# Patient Record
Sex: MALE | Race: WHITE | NOT HISPANIC OR LATINO | Employment: FULL TIME | URBAN - METROPOLITAN AREA
[De-identification: names, ages, dates, MRNs, and addresses within clinical notes are randomized per-mention and may not be internally consistent; named-entity substitution may affect disease eponyms.]

---

## 2022-02-06 ENCOUNTER — TELEPHONE (OUTPATIENT)
Dept: SCHEDULING | Facility: IMAGING CENTER | Age: 47
End: 2022-02-06

## 2022-03-20 SDOH — ECONOMIC STABILITY: FOOD INSECURITY: WITHIN THE PAST 12 MONTHS, THE FOOD YOU BOUGHT JUST DIDN'T LAST AND YOU DIDN'T HAVE MONEY TO GET MORE.: NEVER TRUE

## 2022-03-20 SDOH — ECONOMIC STABILITY: TRANSPORTATION INSECURITY
IN THE PAST 12 MONTHS, HAS THE LACK OF TRANSPORTATION KEPT YOU FROM MEDICAL APPOINTMENTS OR FROM GETTING MEDICATIONS?: NO

## 2022-03-20 SDOH — ECONOMIC STABILITY: HOUSING INSECURITY
IN THE LAST 12 MONTHS, WAS THERE A TIME WHEN YOU DID NOT HAVE A STEADY PLACE TO SLEEP OR SLEPT IN A SHELTER (INCLUDING NOW)?: NO

## 2022-03-20 SDOH — HEALTH STABILITY: MENTAL HEALTH
STRESS IS WHEN SOMEONE FEELS TENSE, NERVOUS, ANXIOUS, OR CAN'T SLEEP AT NIGHT BECAUSE THEIR MIND IS TROUBLED. HOW STRESSED ARE YOU?: ONLY A LITTLE

## 2022-03-20 SDOH — ECONOMIC STABILITY: TRANSPORTATION INSECURITY
IN THE PAST 12 MONTHS, HAS LACK OF RELIABLE TRANSPORTATION KEPT YOU FROM MEDICAL APPOINTMENTS, MEETINGS, WORK OR FROM GETTING THINGS NEEDED FOR DAILY LIVING?: NO

## 2022-03-20 SDOH — HEALTH STABILITY: PHYSICAL HEALTH: ON AVERAGE, HOW MANY DAYS PER WEEK DO YOU ENGAGE IN MODERATE TO STRENUOUS EXERCISE (LIKE A BRISK WALK)?: 7 DAYS

## 2022-03-20 SDOH — ECONOMIC STABILITY: INCOME INSECURITY: HOW HARD IS IT FOR YOU TO PAY FOR THE VERY BASICS LIKE FOOD, HOUSING, MEDICAL CARE, AND HEATING?: NOT HARD AT ALL

## 2022-03-20 SDOH — ECONOMIC STABILITY: FOOD INSECURITY: WITHIN THE PAST 12 MONTHS, YOU WORRIED THAT YOUR FOOD WOULD RUN OUT BEFORE YOU GOT MONEY TO BUY MORE.: NEVER TRUE

## 2022-03-20 SDOH — ECONOMIC STABILITY: HOUSING INSECURITY

## 2022-03-20 SDOH — ECONOMIC STABILITY: INCOME INSECURITY: IN THE LAST 12 MONTHS, WAS THERE A TIME WHEN YOU WERE NOT ABLE TO PAY THE MORTGAGE OR RENT ON TIME?: NO

## 2022-03-20 SDOH — HEALTH STABILITY: PHYSICAL HEALTH: ON AVERAGE, HOW MANY MINUTES DO YOU ENGAGE IN EXERCISE AT THIS LEVEL?: 90 MIN

## 2022-03-20 SDOH — ECONOMIC STABILITY: TRANSPORTATION INSECURITY
IN THE PAST 12 MONTHS, HAS LACK OF TRANSPORTATION KEPT YOU FROM MEETINGS, WORK, OR FROM GETTING THINGS NEEDED FOR DAILY LIVING?: NO

## 2022-03-20 ASSESSMENT — SOCIAL DETERMINANTS OF HEALTH (SDOH)
WITHIN THE PAST 12 MONTHS, YOU WORRIED THAT YOUR FOOD WOULD RUN OUT BEFORE YOU GOT THE MONEY TO BUY MORE: NEVER TRUE
HOW OFTEN DO YOU HAVE A DRINK CONTAINING ALCOHOL: 2-4 TIMES A MONTH
HOW HARD IS IT FOR YOU TO PAY FOR THE VERY BASICS LIKE FOOD, HOUSING, MEDICAL CARE, AND HEATING?: NOT HARD AT ALL
HOW OFTEN DO YOU GET TOGETHER WITH FRIENDS OR RELATIVES?: MORE THAN THREE TIMES A WEEK
HOW MANY DRINKS CONTAINING ALCOHOL DO YOU HAVE ON A TYPICAL DAY WHEN YOU ARE DRINKING: 1 OR 2
HOW OFTEN DO YOU ATTEND CHURCH OR RELIGIOUS SERVICES?: 1 TO 4 TIMES PER YEAR
HOW OFTEN DO YOU ATTENT MEETINGS OF THE CLUB OR ORGANIZATION YOU BELONG TO?: MORE THAN 4 TIMES PER YEAR
HOW OFTEN DO YOU GET TOGETHER WITH FRIENDS OR RELATIVES?: MORE THAN THREE TIMES A WEEK
HOW OFTEN DO YOU ATTENT MEETINGS OF THE CLUB OR ORGANIZATION YOU BELONG TO?: MORE THAN 4 TIMES PER YEAR
HOW OFTEN DO YOU ATTEND CHURCH OR RELIGIOUS SERVICES?: 1 TO 4 TIMES PER YEAR
IN A TYPICAL WEEK, HOW MANY TIMES DO YOU TALK ON THE PHONE WITH FAMILY, FRIENDS, OR NEIGHBORS?: MORE THAN THREE TIMES A WEEK
IN A TYPICAL WEEK, HOW MANY TIMES DO YOU TALK ON THE PHONE WITH FAMILY, FRIENDS, OR NEIGHBORS?: MORE THAN THREE TIMES A WEEK
HOW OFTEN DO YOU HAVE SIX OR MORE DRINKS ON ONE OCCASION: NEVER

## 2022-03-20 ASSESSMENT — LIFESTYLE VARIABLES
HOW MANY STANDARD DRINKS CONTAINING ALCOHOL DO YOU HAVE ON A TYPICAL DAY: 1 OR 2
HOW OFTEN DO YOU HAVE A DRINK CONTAINING ALCOHOL: 2-4 TIMES A MONTH
HOW OFTEN DO YOU HAVE SIX OR MORE DRINKS ON ONE OCCASION: NEVER

## 2022-03-23 ENCOUNTER — OFFICE VISIT (OUTPATIENT)
Dept: MEDICAL GROUP | Facility: PHYSICIAN GROUP | Age: 47
End: 2022-03-23
Payer: COMMERCIAL

## 2022-03-23 VITALS
TEMPERATURE: 97.9 F | HEIGHT: 74 IN | BODY MASS INDEX: 30.93 KG/M2 | HEART RATE: 74 BPM | RESPIRATION RATE: 14 BRPM | OXYGEN SATURATION: 97 % | WEIGHT: 241 LBS | SYSTOLIC BLOOD PRESSURE: 122 MMHG | DIASTOLIC BLOOD PRESSURE: 82 MMHG

## 2022-03-23 DIAGNOSIS — R06.83 LOUD SNORING: ICD-10-CM

## 2022-03-23 DIAGNOSIS — E66.9 OBESITY (BMI 30.0-34.9): ICD-10-CM

## 2022-03-23 DIAGNOSIS — K57.30 DIVERTICULOSIS OF COLON: ICD-10-CM

## 2022-03-23 DIAGNOSIS — I10 PRIMARY HYPERTENSION: ICD-10-CM

## 2022-03-23 DIAGNOSIS — Z91.89 AT RISK FOR SLEEP APNEA: ICD-10-CM

## 2022-03-23 DIAGNOSIS — E78.2 MIXED HYPERLIPIDEMIA: ICD-10-CM

## 2022-03-23 PROBLEM — E66.811 OBESITY (BMI 30.0-34.9): Status: ACTIVE | Noted: 2022-03-23

## 2022-03-23 PROCEDURE — 99204 OFFICE O/P NEW MOD 45 MIN: CPT | Performed by: INTERNAL MEDICINE

## 2022-03-23 RX ORDER — LOSARTAN POTASSIUM 100 MG/1
100 TABLET ORAL DAILY
Qty: 90 EACH | Refills: 3 | Status: SHIPPED | OUTPATIENT
Start: 2022-03-23 | End: 2023-05-23

## 2022-03-23 RX ORDER — LOSARTAN POTASSIUM 100 MG/1
TABLET ORAL
COMMUNITY
End: 2022-03-23 | Stop reason: SDUPTHER

## 2022-03-23 RX ORDER — ATORVASTATIN CALCIUM 20 MG/1
TABLET, FILM COATED ORAL
COMMUNITY
End: 2022-03-23 | Stop reason: SDUPTHER

## 2022-03-23 RX ORDER — POTASSIUM CHLORIDE 750 MG/1
CAPSULE, EXTENDED RELEASE ORAL
COMMUNITY
Start: 2022-02-08 | End: 2022-03-23

## 2022-03-23 RX ORDER — POTASSIUM CHLORIDE 750 MG/1
10 CAPSULE, EXTENDED RELEASE ORAL
COMMUNITY
Start: 2022-02-08 | End: 2022-03-23 | Stop reason: SDUPTHER

## 2022-03-23 RX ORDER — POTASSIUM CHLORIDE 750 MG/1
10 CAPSULE, EXTENDED RELEASE ORAL 2 TIMES DAILY
Qty: 180 CAPSULE | Refills: 3 | Status: SHIPPED | OUTPATIENT
Start: 2022-03-23 | End: 2022-10-13

## 2022-03-23 RX ORDER — ATORVASTATIN CALCIUM 20 MG/1
20 TABLET, FILM COATED ORAL DAILY
Qty: 90 TABLET | Refills: 3 | Status: SHIPPED | OUTPATIENT
Start: 2022-03-23 | End: 2023-05-25

## 2022-03-23 ASSESSMENT — PATIENT HEALTH QUESTIONNAIRE - PHQ9: CLINICAL INTERPRETATION OF PHQ2 SCORE: 0

## 2022-03-23 NOTE — PROGRESS NOTES
"New Patient to establish    Chief Complaint   Patient presents with   • Establish Care       Subjective:     History of Present Illness: Patient is a 46 y.o. male who is here today to establish primary care    Current Outpatient Medications on File Prior to Visit   Medication Sig Dispense Refill   • Ascorbic Acid (VITAMIN C PO) Take 500 mg by mouth.     • VITAMIN D PO Take  by mouth.     • ASPIRIN 81 PO Take  by mouth.       No current facility-administered medications on file prior to visit.     No Known Allergies  Patient Active Problem List    Diagnosis Date Noted   • Primary hypertension 03/23/2022   • Mixed hyperlipidemia 03/23/2022   • Obesity (BMI 30.0-34.9) 03/23/2022   • Loud snoring 03/23/2022   • At risk for sleep apnea 03/23/2022   • Diverticulosis of colon 03/23/2022     No past medical history on file.  No past surgical history on file.  No family history on file.       ROS:  All other systems reviewed and are negative except as stated in the HPI       Physical Exam:     /82 (BP Location: Left arm, Patient Position: Sitting, BP Cuff Size: Large adult)   Pulse 74   Temp 36.6 °C (97.9 °F) (Temporal)   Resp 14   Ht 1.88 m (6' 2\")   Wt 109 kg (241 lb)   SpO2 97%   BMI 30.94 kg/m²   General: Normal appearing. No distress.  Pulmonary: Clear to ausculation.  Normal effort.   Cardiovascular: Regular rate and rhythm    Assessment and Plan:     1. Primary hypertension  Chronic, stable, patient check blood pressure at home with normal range, he is taking losartan as below, denied having related symptoms  - losartan (COZAAR) 100 MG Tab; Take 1 Tablet by mouth every day.  Dispense: 90 Each; Refill: 3  - potassium chloride (MICRO-K) 10 MEQ capsule; Take 1 Capsule by mouth 2 times a day.  Dispense: 180 Capsule; Refill: 3    -discussion in details on target blood pressure goal, advised monitoring BP closely at home.  Have BP log to present at follow-up visit or send through my chart.   -Advised low-salt " diet, healthy dietary option include plenty of vegetable, reduce refine carbohydrates and sugar, regular exercise as tolerated, healthy fat/protein/carbs, also avoid alcohol, no NSAIDs  If symptoms worsen or persist patient can return to clinic for reevaluation.  Red flags and strict emergency room precautions discussed.  Discussed side effects of medication. Patient understand    2. Mixed hyperlipidemia  Chronic, stable, he is taking Lipitor 20 mg daily, denied having side effect  - atorvastatin (LIPITOR) 20 MG Tab; Take 1 Tablet by mouth every day.  Dispense: 90 Tablet; Refill: 3    3. Obesity (BMI 30.0-34.9)  4. Loud snoring  5. At risk for sleep apnea  Patient is actively losing weight, as well as tremendous amount of time spending with exercise, reported of loud snoring and sometimes stop breathing at night, there is risk for sleep apnea, however patient would like to lose more weight and see if he continue having snoring or not then will decide for sleep study    6. Diverticulosis of colon  Colonoscopy March 2021 with mild diverticulosis.  -Reported would like to colonoscopy every 2 to 3 years to make sure there is no polyps or precancerous lesion.      -Patient had lab done in December 2021 with normal CMP, CBC, total PSA 0.8, free PSA percentage 25,    Follow Up:      Return in about 6 months (around 9/23/2022).    Please note that this dictation was created using voice recognition software. I have made every reasonable attempt to correct obvious errors, but I expect that there are errors of grammar and possibly content that I did not discover before finalizing the note.    Signed by: Barb Day M.D.

## 2022-03-23 NOTE — LETTER
[facility]  Barb Day M.D.  [address]  [place] [address]  Fax: [phone]   Authorization for Release/Disclosure of   Protected Health Information   Name: KAISER MONTALVO : 1975 SSN: [id number]   Address: [facility] [address] [place] [address] Phone:    [phone] (home)    I authorize the entity listed below to release/disclose the PHI below to:   [facility]/Barb Day M.D. and Barb Day M.D.   Provider or Entity Name:  Dr. Junior Amin    Address   City, State, Zip   Phone:  [phone]    Fax:     Reason for request: continuity of care   Information to be released:    [  ] LAST COLONOSCOPY,  including any PATH REPORT and follow-up  [  ] LAST FIT/COLOGUARD RESULT [  ] LAST DEXA  [  ] LAST MAMMOGRAM  [  ] LAST PAP  [  ] LAST LABS [  ] RETINA EXAM REPORT  [  ] IMMUNIZATION RECORDS  [ xxx ] Release all info      [  ] Check here and initial the line next to each item to release ALL health information INCLUDING  _____ Care and treatment for drug and / or alcohol abuse  _____ HIV testing, infection status, or AIDS  _____ Genetic Testing    DATES OF SERVICE OR TIME PERIOD TO BE DISCLOSED: _____________  I understand and acknowledge that:  * This Authorization may be revoked at any time by you in writing, except if your health information has already been used or disclosed.  * Your health information that will be used or disclosed as a result of you signing this authorization could be re-disclosed by the recipient. If this occurs, your re-disclosed health information may no longer be protected by State or Federal laws.  * You may refuse to sign this Authorization. Your refusal will not affect your ability to obtain treatment.  * This Authorization becomes effective upon signing and will  on (date) __________.      If no date is indicated, this Authorization will  one (1) year from the signature date.    Name: Kaiser Montalvo    Signature: Continuity of Care     Date:     3/23/2022       PLEASE FAX REQUESTED RECORDS BACK TO: [phone]

## 2022-06-28 DIAGNOSIS — Z13.21 ENCOUNTER FOR VITAMIN DEFICIENCY SCREENING: ICD-10-CM

## 2022-06-28 DIAGNOSIS — I10 PRIMARY HYPERTENSION: ICD-10-CM

## 2022-06-28 DIAGNOSIS — E66.9 OBESITY (BMI 30.0-34.9): ICD-10-CM

## 2022-06-28 DIAGNOSIS — Z13.1 DIABETES MELLITUS SCREENING: ICD-10-CM

## 2022-06-28 DIAGNOSIS — E55.9 VITAMIN D DEFICIENCY: ICD-10-CM

## 2022-06-28 DIAGNOSIS — E78.2 MIXED HYPERLIPIDEMIA: ICD-10-CM

## 2022-06-28 DIAGNOSIS — Z13.29 THYROID DISORDER SCREEN: ICD-10-CM

## 2022-06-29 ENCOUNTER — HOSPITAL ENCOUNTER (OUTPATIENT)
Dept: LAB | Facility: MEDICAL CENTER | Age: 47
End: 2022-06-29
Attending: INTERNAL MEDICINE
Payer: COMMERCIAL

## 2022-06-29 DIAGNOSIS — Z13.29 THYROID DISORDER SCREEN: ICD-10-CM

## 2022-06-29 DIAGNOSIS — I10 PRIMARY HYPERTENSION: ICD-10-CM

## 2022-06-29 DIAGNOSIS — E78.2 MIXED HYPERLIPIDEMIA: ICD-10-CM

## 2022-06-29 DIAGNOSIS — E66.9 OBESITY (BMI 30.0-34.9): ICD-10-CM

## 2022-06-29 DIAGNOSIS — Z13.1 DIABETES MELLITUS SCREENING: ICD-10-CM

## 2022-06-29 DIAGNOSIS — E55.9 VITAMIN D DEFICIENCY: ICD-10-CM

## 2022-06-29 DIAGNOSIS — Z13.21 ENCOUNTER FOR VITAMIN DEFICIENCY SCREENING: ICD-10-CM

## 2022-06-29 LAB
25(OH)D3 SERPL-MCNC: 27 NG/ML (ref 30–100)
ALBUMIN SERPL BCP-MCNC: 4.7 G/DL (ref 3.2–4.9)
ALBUMIN/GLOB SERPL: 1.7 G/DL
ALP SERPL-CCNC: 58 U/L (ref 30–99)
ALT SERPL-CCNC: 32 U/L (ref 2–50)
ANION GAP SERPL CALC-SCNC: 12 MMOL/L (ref 7–16)
AST SERPL-CCNC: 21 U/L (ref 12–45)
BILIRUB SERPL-MCNC: 0.6 MG/DL (ref 0.1–1.5)
BUN SERPL-MCNC: 14 MG/DL (ref 8–22)
CALCIUM SERPL-MCNC: 9.5 MG/DL (ref 8.5–10.5)
CHLORIDE SERPL-SCNC: 104 MMOL/L (ref 96–112)
CHOLEST SERPL-MCNC: 169 MG/DL (ref 100–199)
CO2 SERPL-SCNC: 23 MMOL/L (ref 20–33)
CREAT SERPL-MCNC: 0.92 MG/DL (ref 0.5–1.4)
EST. AVERAGE GLUCOSE BLD GHB EST-MCNC: 117 MG/DL
FASTING STATUS PATIENT QL REPORTED: NORMAL
GFR SERPLBLD CREATININE-BSD FMLA CKD-EPI: 103 ML/MIN/1.73 M 2
GLOBULIN SER CALC-MCNC: 2.7 G/DL (ref 1.9–3.5)
GLUCOSE SERPL-MCNC: 77 MG/DL (ref 65–99)
HBA1C MFR BLD: 5.7 % (ref 4–5.6)
HDLC SERPL-MCNC: 58 MG/DL
LDLC SERPL CALC-MCNC: 90 MG/DL
POTASSIUM SERPL-SCNC: 4.1 MMOL/L (ref 3.6–5.5)
PROT SERPL-MCNC: 7.4 G/DL (ref 6–8.2)
SODIUM SERPL-SCNC: 139 MMOL/L (ref 135–145)
T3FREE SERPL-MCNC: 3.04 PG/ML (ref 2–4.4)
T4 FREE SERPL-MCNC: 1.85 NG/DL (ref 0.93–1.7)
THYROPEROXIDASE AB SERPL-ACNC: <9 IU/ML (ref 0–9)
TRIGL SERPL-MCNC: 105 MG/DL (ref 0–149)
TSH SERPL DL<=0.005 MIU/L-ACNC: 2.79 UIU/ML (ref 0.38–5.33)
VIT B12 SERPL-MCNC: 398 PG/ML (ref 211–911)

## 2022-06-29 PROCEDURE — 36415 COLL VENOUS BLD VENIPUNCTURE: CPT

## 2022-06-29 PROCEDURE — 83036 HEMOGLOBIN GLYCOSYLATED A1C: CPT

## 2022-06-29 PROCEDURE — 86376 MICROSOMAL ANTIBODY EACH: CPT

## 2022-06-29 PROCEDURE — 84439 ASSAY OF FREE THYROXINE: CPT

## 2022-06-29 PROCEDURE — 80053 COMPREHEN METABOLIC PANEL: CPT

## 2022-06-29 PROCEDURE — 84481 FREE ASSAY (FT-3): CPT

## 2022-06-29 PROCEDURE — 80061 LIPID PANEL: CPT

## 2022-06-29 PROCEDURE — 82607 VITAMIN B-12: CPT

## 2022-06-29 PROCEDURE — 82306 VITAMIN D 25 HYDROXY: CPT

## 2022-06-29 PROCEDURE — 84443 ASSAY THYROID STIM HORMONE: CPT

## 2022-06-30 DIAGNOSIS — Z12.5 PROSTATE CANCER SCREENING: ICD-10-CM

## 2022-09-22 ENCOUNTER — HOSPITAL ENCOUNTER (OUTPATIENT)
Dept: LAB | Facility: MEDICAL CENTER | Age: 47
End: 2022-09-22
Attending: INTERNAL MEDICINE
Payer: COMMERCIAL

## 2022-09-22 DIAGNOSIS — Z12.5 PROSTATE CANCER SCREENING: ICD-10-CM

## 2022-09-22 PROCEDURE — 84153 ASSAY OF PSA TOTAL: CPT

## 2022-09-22 PROCEDURE — 84154 ASSAY OF PSA FREE: CPT

## 2022-09-22 PROCEDURE — 36415 COLL VENOUS BLD VENIPUNCTURE: CPT

## 2022-09-27 LAB
PSA FREE MFR SERPL: 13 %
PSA FREE SERPL-MCNC: 0.2 NG/ML
PSA SERPL-MCNC: 1.5 NG/ML (ref 0–4)

## 2022-10-13 ENCOUNTER — OFFICE VISIT (OUTPATIENT)
Dept: MEDICAL GROUP | Facility: PHYSICIAN GROUP | Age: 47
End: 2022-10-13
Payer: COMMERCIAL

## 2022-10-13 VITALS
DIASTOLIC BLOOD PRESSURE: 82 MMHG | SYSTOLIC BLOOD PRESSURE: 132 MMHG | TEMPERATURE: 96.9 F | RESPIRATION RATE: 14 BRPM | WEIGHT: 255 LBS | BODY MASS INDEX: 32.73 KG/M2 | HEART RATE: 90 BPM | OXYGEN SATURATION: 92 % | HEIGHT: 74 IN

## 2022-10-13 DIAGNOSIS — Z23 NEED FOR VACCINATION: ICD-10-CM

## 2022-10-13 DIAGNOSIS — K57.30 DIVERTICULOSIS OF COLON: ICD-10-CM

## 2022-10-13 DIAGNOSIS — E78.2 MIXED HYPERLIPIDEMIA: ICD-10-CM

## 2022-10-13 DIAGNOSIS — Z13.21 ENCOUNTER FOR VITAMIN DEFICIENCY SCREENING: ICD-10-CM

## 2022-10-13 DIAGNOSIS — R79.89 ELEVATED SERUM FREE T4 LEVEL: ICD-10-CM

## 2022-10-13 DIAGNOSIS — E55.9 VITAMIN D DEFICIENCY: ICD-10-CM

## 2022-10-13 DIAGNOSIS — Z13.29 SCREENING FOR THYROID DISORDER: ICD-10-CM

## 2022-10-13 DIAGNOSIS — I10 PRIMARY HYPERTENSION: ICD-10-CM

## 2022-10-13 DIAGNOSIS — R73.03 PREDIABETES: ICD-10-CM

## 2022-10-13 DIAGNOSIS — E66.9 OBESITY (BMI 30.0-34.9): ICD-10-CM

## 2022-10-13 DIAGNOSIS — Z12.11 SCREEN FOR COLON CANCER: ICD-10-CM

## 2022-10-13 PROCEDURE — 99214 OFFICE O/P EST MOD 30 MIN: CPT | Mod: 25 | Performed by: INTERNAL MEDICINE

## 2022-10-13 PROCEDURE — 90686 IIV4 VACC NO PRSV 0.5 ML IM: CPT | Performed by: INTERNAL MEDICINE

## 2022-10-13 PROCEDURE — 90471 IMMUNIZATION ADMIN: CPT | Performed by: INTERNAL MEDICINE

## 2022-10-13 NOTE — LETTER
[facility]  Barb Day M.D.  [address]  [place] [address]  Fax: [phone]   Authorization for Release/Disclosure of   Protected Health Information   Name: KAISER CISSE : 1975 SSN: [id number]   Address: [address] [facility] [address] [place] [place] [address] Phone:    [phone] (home)    I authorize the entity listed below to release/disclose the PHI below to:   [facility]/Barb Dya M.D. and Barb Day M.D.   Provider or Entity Name:  [facility] / Pio Parrish MD   Address   City, State, [facility] [address]   Phone:      Fax:     Reason for request: continuity of care   Information to be released:    [  ] LAST COLONOSCOPY,  including any PATH REPORT and follow-up  [  ] LAST FIT/COLOGUARD RESULT [  ] LAST DEXA  [  ] LAST MAMMOGRAM  [  ] LAST PAP  [  ] LAST LABS [  ] RETINA EXAM REPORT  [  ] IMMUNIZATION RECORDS  [XX  ] Release all info      [  ] Check here and initial the line next to each item to release ALL health information INCLUDING  _____ Care and treatment for drug and / or alcohol abuse  _____ HIV testing, infection status, or AIDS  _____ Genetic Testing    DATES OF SERVICE OR TIME PERIOD TO BE DISCLOSED: _____________  I understand and acknowledge that:  * This Authorization may be revoked at any time by you in writing, except if your health information has already been used or disclosed.  * Your health information that will be used or disclosed as a result of you signing this authorization could be re-disclosed by the recipient. If this occurs, your re-disclosed health information may no longer be protected by State or Federal laws.  * You may refuse to sign this Authorization. Your refusal will not affect your ability to obtain treatment.  * This Authorization becomes effective upon signing and will  on (date) __________.      If no date is indicated, this Authorization will  one (1) year from the signature date.    Name: Kaiser Aguilar  Selvin    Signature: CONTINUITY OF CARE    Date:     10/13/2022       PLEASE FAX REQUESTED RECORDS BACK TO: [phone]

## 2022-10-13 NOTE — PROGRESS NOTES
"Established Patient    Chief Complaint   Patient presents with    Follow-Up       Subjective:     HPI:   Kaiser presents today with the following.    Patient Active Problem List    Diagnosis Date Noted    Prediabetes 10/13/2022    Vitamin D deficiency 06/28/2022    Primary hypertension 03/23/2022    Mixed hyperlipidemia 03/23/2022    Obesity (BMI 30.0-34.9) 03/23/2022    Loud snoring 03/23/2022    At risk for sleep apnea 03/23/2022    Diverticulosis of colon 03/23/2022       Current Outpatient Medications on File Prior to Visit   Medication Sig Dispense Refill    Ascorbic Acid (VITAMIN C PO) Take 500 mg by mouth.      VITAMIN D PO Take  by mouth.      atorvastatin (LIPITOR) 20 MG Tab Take 1 Tablet by mouth every day. 90 Tablet 3    losartan (COZAAR) 100 MG Tab Take 1 Tablet by mouth every day. 90 Each 3     No current facility-administered medications on file prior to visit.       Allergies, past medical history, past surgical history, family history, social history reviewed and updated    ROS:  All other systems reviewed and are negative except as stated in the HPI       Physical Exam:     /82 (BP Location: Right arm, Patient Position: Sitting, BP Cuff Size: Adult long)   Pulse 90   Temp 36.1 °C (96.9 °F) (Temporal)   Resp 14   Ht 1.88 m (6' 2\")   Wt 116 kg (255 lb)   SpO2 92%   BMI 32.74 kg/m²   General: Normal appearing. No distress.  Pulmonary: Clear to ausculation.  Normal effort.   Cardiovascular: Regular rate and rhythm    Assessment and Plan:     47 y.o. male with the following issues.    1. Primary hypertension  Well-controlled, patient continue losartan 100 mg daily, denied having symptoms related  - Comp Metabolic Panel; Future    2. Mixed hyperlipidemia  Patient continue atorvastatin 20 mg daily, denies any side effects  - Lipid Profile; Future    3. Obesity (BMI 30.0-34.9)    4. Diverticulosis of colon  Patient would like to perform colonoscopy next year, reported family history of GI " cancer as well    5. Vitamin D deficiency  Continue vitamin D supplement  - VITAMIN D,25 HYDROXY (DEFICIENCY); Future    6. Screening for thyroid disorder  - 9. Elevated serum free T4 level  - FREE THYROXINE; Future  - TSH; Future  - T3 FREE; Future    7. Encounter for vitamin deficiency screening  - VITAMIN B12; Future    8. Screen for colon cancer  - Referral to GI for Colonoscopy    10. Prediabetes  - HEMOGLOBIN A1C; Future    11. Need for vaccination  - INFLUENZA VACCINE QUAD INJ (PF)    Follow Up:      No follow-ups on file.    Please note that this dictation was created using voice recognition software. I have made every reasonable attempt to correct obvious errors, but I expect that there are errors of grammar and possibly content that I did not discover before finalizing the note.    Signed by: Barb Day M.D.

## 2023-01-19 ENCOUNTER — OFFICE VISIT (OUTPATIENT)
Dept: MEDICAL GROUP | Facility: PHYSICIAN GROUP | Age: 48
End: 2023-01-19
Payer: COMMERCIAL

## 2023-01-19 VITALS
TEMPERATURE: 98.6 F | DIASTOLIC BLOOD PRESSURE: 82 MMHG | RESPIRATION RATE: 12 BRPM | WEIGHT: 259 LBS | HEIGHT: 74 IN | SYSTOLIC BLOOD PRESSURE: 138 MMHG | OXYGEN SATURATION: 93 % | BODY MASS INDEX: 33.24 KG/M2 | HEART RATE: 74 BPM

## 2023-01-19 DIAGNOSIS — R73.03 PREDIABETES: ICD-10-CM

## 2023-01-19 DIAGNOSIS — Z00.00 HEALTH CARE MAINTENANCE: ICD-10-CM

## 2023-01-19 DIAGNOSIS — B00.1 HERPES LABIALIS: ICD-10-CM

## 2023-01-19 DIAGNOSIS — E78.2 MIXED HYPERLIPIDEMIA: ICD-10-CM

## 2023-01-19 DIAGNOSIS — E66.9 OBESITY (BMI 30.0-34.9): ICD-10-CM

## 2023-01-19 DIAGNOSIS — Z91.89 AT RISK FOR SLEEP APNEA: ICD-10-CM

## 2023-01-19 DIAGNOSIS — R06.83 LOUD SNORING: ICD-10-CM

## 2023-01-19 DIAGNOSIS — Z12.11 SCREEN FOR COLON CANCER: ICD-10-CM

## 2023-01-19 DIAGNOSIS — I10 PRIMARY HYPERTENSION: ICD-10-CM

## 2023-01-19 DIAGNOSIS — E55.9 VITAMIN D DEFICIENCY: ICD-10-CM

## 2023-01-19 PROCEDURE — 99396 PREV VISIT EST AGE 40-64: CPT | Performed by: INTERNAL MEDICINE

## 2023-01-19 PROCEDURE — 99213 OFFICE O/P EST LOW 20 MIN: CPT | Mod: 25 | Performed by: INTERNAL MEDICINE

## 2023-01-19 RX ORDER — VALACYCLOVIR HYDROCHLORIDE 500 MG/1
TABLET, FILM COATED ORAL
Qty: 30 TABLET | Refills: 3 | Status: SHIPPED | OUTPATIENT
Start: 2023-01-19 | End: 2023-05-30 | Stop reason: SDUPTHER

## 2023-01-19 ASSESSMENT — PATIENT HEALTH QUESTIONNAIRE - PHQ9: CLINICAL INTERPRETATION OF PHQ2 SCORE: 0

## 2023-01-19 NOTE — PROGRESS NOTES
"Established Patient    Chief Complaint   Patient presents with    Follow-Up    Medication Refill       Subjective:     HPI:   Kaiser presents today with the following.    Patient Active Problem List    Diagnosis Date Noted    Herpes labialis 01/19/2023    Prediabetes 10/13/2022    Vitamin D deficiency 06/28/2022    Primary hypertension 03/23/2022    Mixed hyperlipidemia 03/23/2022    Obesity (BMI 30.0-34.9) 03/23/2022    Loud snoring 03/23/2022    At risk for sleep apnea 03/23/2022    Diverticulosis of colon 03/23/2022       Current Outpatient Medications on File Prior to Visit   Medication Sig Dispense Refill    Ascorbic Acid (VITAMIN C PO) Take 500 mg by mouth.      VITAMIN D PO Take  by mouth.      atorvastatin (LIPITOR) 20 MG Tab Take 1 Tablet by mouth every day. 90 Tablet 3    losartan (COZAAR) 100 MG Tab Take 1 Tablet by mouth every day. 90 Each 3     No current facility-administered medications on file prior to visit.       Allergies, past medical history, past surgical history, family history, social history reviewed and updated    ROS:  All other systems reviewed and are negative except as stated in the HPI       Physical Exam:     /82 (BP Location: Right arm, Patient Position: Sitting, BP Cuff Size: Adult)   Pulse 74   Temp 37 °C (98.6 °F) (Temporal)   Resp 12   Ht 1.88 m (6' 2\")   Wt 117 kg (259 lb)   SpO2 93%   BMI 33.25 kg/m²   General: Normal appearing. No distress.  Pulmonary: Clear to ausculation.  Normal effort.   Cardiovascular: Regular rate and rhythm    Assessment and Plan:     47 y.o. male with the following issues.    1. Herpes labialis  Reported every 3 months scheduled her for surveillance, he would like to have treatment for recurrent episodes, valacyclovir   500 mg twice daily for 3 days order placed    2. Obesity (BMI 30.0-34.9)  - Overnight Home Sleep Study; Future  - Referral to Pulmonary and Sleep Medicine    3. Mixed hyperlipidemia  - Overnight Home Sleep Study; Future  - " Referral to Pulmonary and Sleep Medicine    4. Prediabetes  5. Primary hypertension  - Overnight Home Sleep Study; Future  - Referral to Pulmonary and Sleep Medicine    6. Vitamin D deficiency    7. Loud snoring  - Overnight Home Sleep Study; Future  - Referral to Pulmonary and Sleep Medicine    8. At risk for sleep apnea  - Overnight Home Sleep Study; Future  - Referral to Pulmonary and Sleep Medicine    9. Screen for colon cancer  Healthcare maintenance  [facility]  Patient refused recent vaccines  Patient check blood pressure at home with normal range, he is taking losartan 100 mg daily, also Lipitor 20 mg daily, denies any side effect,    -discussion in details on target blood pressure goal, advised monitoring BP closely at home.  Have BP log to present at follow-up visit or send through my chart.   -Advised low-salt diet, healthy dietary option include plenty of vegetable, reduce refine carbohydrates and sugar, regular exercise as tolerated, healthy fat/protein/carbs, also avoid alcohol, no NSAIDs  If symptoms worsen or persist patient can return to clinic for reevaluation.  Red flags and strict emergency room precautions discussed.  Discussed side effects of medication. Patient understand    Patient also has pending lab, advised to perform labs    Patient also is here for wellness, denied having depression or other mood issues, he is regularly following up with ophthalmology as well as dentist, he is also performing regular exercise and healthy lifestyle healthy diet      STOP BANG score    Do you snore loudly: yes  Do you often feel tired, fatigued, or sleepy during daytime? yes  Has anyone observed you stop breathing during your sleep? Not sure  Do you have or are you being treated for high blood pressure? yes  BMI more than 35 kg m-2? no  Age over 50 years? no  Neck circumference >40 cm? yes    If positive on 3 or more, screen for DAY via sleep study    Follow Up:      Return in about 4 weeks  (around 2/16/2023) for labs.    Please note that this dictation was created using voice recognition software. I have made every reasonable attempt to correct obvious errors, but I expect that there are errors of grammar and possibly content that I did not discover before finalizing the note.    Signed by: Barb Day M.D.

## 2023-02-13 ENCOUNTER — OFFICE VISIT (OUTPATIENT)
Dept: MEDICAL GROUP | Facility: PHYSICIAN GROUP | Age: 48
End: 2023-02-13
Payer: COMMERCIAL

## 2023-02-13 ENCOUNTER — HOSPITAL ENCOUNTER (OUTPATIENT)
Dept: RADIOLOGY | Facility: MEDICAL CENTER | Age: 48
End: 2023-02-13
Attending: INTERNAL MEDICINE
Payer: COMMERCIAL

## 2023-02-13 VITALS
SYSTOLIC BLOOD PRESSURE: 148 MMHG | WEIGHT: 262 LBS | HEART RATE: 64 BPM | BODY MASS INDEX: 33.62 KG/M2 | DIASTOLIC BLOOD PRESSURE: 80 MMHG | OXYGEN SATURATION: 96 % | TEMPERATURE: 97.8 F | HEIGHT: 74 IN | RESPIRATION RATE: 14 BRPM

## 2023-02-13 DIAGNOSIS — N50.812 LEFT TESTICULAR PAIN: ICD-10-CM

## 2023-02-13 DIAGNOSIS — N50.89 TESTICULAR SWELLING, LEFT: ICD-10-CM

## 2023-02-13 DIAGNOSIS — N50.812 TESTICULAR PAIN, LEFT: ICD-10-CM

## 2023-02-13 PROCEDURE — 99214 OFFICE O/P EST MOD 30 MIN: CPT | Performed by: INTERNAL MEDICINE

## 2023-02-13 PROCEDURE — 76870 US EXAM SCROTUM: CPT

## 2023-02-13 NOTE — PROGRESS NOTES
"Established Patient    Chief Complaint   Patient presents with    Testicle Pain     Swelling       Subjective:     HPI:   Kaiser presents today with the following.    Patient Active Problem List    Diagnosis Date Noted    Herpes labialis 01/19/2023    Prediabetes 10/13/2022    Vitamin D deficiency 06/28/2022    Primary hypertension 03/23/2022    Mixed hyperlipidemia 03/23/2022    Obesity (BMI 30.0-34.9) 03/23/2022    Loud snoring 03/23/2022    At risk for sleep apnea 03/23/2022    Diverticulosis of colon 03/23/2022       Current Outpatient Medications on File Prior to Visit   Medication Sig Dispense Refill    valACYclovir (VALTREX) 500 MG Tab Take 500 mg twice daily for 3 days during episodes of herpes labialis 30 Tablet 3    Ascorbic Acid (VITAMIN C PO) Take 500 mg by mouth.      VITAMIN D PO Take  by mouth.      atorvastatin (LIPITOR) 20 MG Tab Take 1 Tablet by mouth every day. 90 Tablet 3    losartan (COZAAR) 100 MG Tab Take 1 Tablet by mouth every day. 90 Each 3     No current facility-administered medications on file prior to visit.       Allergies, past medical history, past surgical history, family history, social history reviewed and updated    ROS:  All other systems reviewed and are negative except as stated in the HPI       Physical Exam:     BP (!) 148/80 (BP Location: Left arm, Patient Position: Sitting, BP Cuff Size: Adult long)   Pulse 64   Temp 36.6 °C (97.8 °F) (Temporal)   Resp 14   Ht 1.88 m (6' 2\")   Wt 119 kg (262 lb)   SpO2 96%   BMI 33.64 kg/m²   General: Normal appearing. No distress.  Pulmonary: Clear to ausculation.  Normal effort.   Cardiovascular: Regular rate and rhythm    Assessment and Plan:     47 y.o. male with the following issues.    1. Left testicular pain  - [id number]; Future    2. Testicular swelling, left  Reported had some push-up with some exercise, after that resulted in some pain of the left testicle, he traveled to [place] to visit his family members, " ended up having severe pain with testicular swelling, he visited primary care over there and think that the patient has orchitis, prescribed Augmentin of 500 mg twice daily for 7 days, reported some partial improvement with antibiotic, he also had some lab with unremarkable finding, urinalysis was unremarkable  Today on exam the left testicle is swollen, painful, tender, suspicious for orchitis, patient denied having penile discharge, denied having STD risks,   Ultrasound stat order placed, he would like to do today because he is going to travel to [place] tonight, if this is continued, will perform UA, urine culture as well as STD work-up    > ER visit precaution explained to the patient in detail.  Patient understands      Please note that this dictation was created using voice recognition software. I have made every reasonable attempt to correct obvious errors, but I expect that there are errors of grammar and possibly content that I did not discover before finalizing the note.    Signed by: Barb Day M.D.

## 2023-02-23 ENCOUNTER — TELEMEDICINE (OUTPATIENT)
Dept: SLEEP MEDICINE | Facility: MEDICAL CENTER | Age: 48
End: 2023-02-23
Attending: INTERNAL MEDICINE
Payer: COMMERCIAL

## 2023-02-23 VITALS — HEIGHT: 74 IN | BODY MASS INDEX: 32.34 KG/M2 | WEIGHT: 252 LBS

## 2023-02-23 DIAGNOSIS — I10 PRIMARY HYPERTENSION: ICD-10-CM

## 2023-02-23 DIAGNOSIS — G47.30 SLEEP DISORDER BREATHING: Primary | ICD-10-CM

## 2023-02-23 DIAGNOSIS — E66.9 OBESITY (BMI 30.0-34.9): ICD-10-CM

## 2023-02-23 DIAGNOSIS — R06.81 WITNESSED EPISODE OF APNEA: ICD-10-CM

## 2023-02-23 DIAGNOSIS — R06.83 LOUD SNORING: ICD-10-CM

## 2023-02-23 PROCEDURE — 99203 OFFICE O/P NEW LOW 30 MIN: CPT | Mod: 95 | Performed by: STUDENT IN AN ORGANIZED HEALTH CARE EDUCATION/TRAINING PROGRAM

## 2023-02-23 NOTE — PROGRESS NOTES
[facility] Consult Note     Date: 2023 / Time: 10:15 AM    CC: Telemedicine sleep consultation    This sleep consultation is provided using Telemedicine and secure encrypted software. Consent was obtained.    Consulting MD: Chito Sutherland M.D.  Requesting Physician: Barb Day M.D.  Patient name:      Kaiser Montalvo  : 1975   MRN:  [id number]    This visit was conducted via Zoom using secure and encrypted videoconferencing technology.   The patient was in a private location.    The patient's identity was confirmed and verbal consent was obtained for this virtual visit.      HISTORY OF PRESENT ILLNESS:     Kaiser Montalvo is a 47 y.o. male with hypertension, hyperlipidemia, obesity and loud snoring.  Presents to Sleep Clinic for evaluation of snoring.     He reports for the last 20 years he has had snoring.  Over the years his snoring has continued to be loud.  His wife is reported that he does have gasping episodes and occasional pauses in his breathing at night.  He overall finds his sleep restorative.  He has no trouble falling asleep or staying asleep.    He reports overall he is doing well.  He tends to maintain an active lifestyle with hiking and outdoor activities.  He works out fairly regularly in the morning.  He states he is mainly here at the recommendation of his wife regarding his snoring as there are times where he will often leave the bedroom and not cosleep with his wife due to how loud his snoring is.    As per suppose questioner:    Sleep Schedule  Bedtime:  9-930pm  Weekend 9-30pm  Wake time:  4-430am Weekend 4-430am  Sleep-onset latency: mins   Awakenings from sleep: 1-2  Difficulty falling back asleep: No  Bedroom partner: Wife sometimes,   Naps: on weekends 2-3 pm, for 1 hour      DAYTIME SYMPTOMS:   Excessive daytime sleepiness: No   Daytime fatigue: No   Difficulty concentrating: No   Memory problems: No   Irritability:No  "  Work/school performance issues: No   Sleepiness with driving: No   Caffeine/stimulant use: Yes coffee in morning stops 1pm  Alcohol use:Yes, How Many? 2-3 drinks a week      SLEEP RELATED SYMPTOMS  Snoring: YES  Witnessed apnea or gasping/choking: Yes, gasping per wife, witnessed apneas   Dry mouth or mouth breathing: No   Sweating: No   Teeth grinding/biting: Yes in past   Morning headaches: No   Refreshed Upon Awakening: Yes     SLEEP RELATED BEHAVIORS:  Parasomnias (walking, talking, eating, violence): No   Leg kicking: No   Restless legs - \"urge to move\": No   Nightmares: No  Recurrent: No   Dream enactment: No      NARCOLEPSY:  Cataplexy: No   Sleep paralysis: No   Sleep attacks: No   Hypnagogic/hypnopompic hallucinations: No     MEDICAL HISTORY  History reviewed. No pertinent past medical history.     SURGICAL HISTORY  History reviewed. No pertinent surgical history.     FAMILY HISTORY  History reviewed. No pertinent family history.    SOCIAL HISTORY  Social History     Socioeconomic History    Marital status:     Highest education level: Professional school degree (e.g., MD, DDS, DVM, KIN)   Tobacco Use    Smoking status: Never    Smokeless tobacco: Never   Vaping Use    Vaping Use: Never used   Substance and Sexual Activity    Alcohol use: Yes     Alcohol/week: 0.6 oz     Types: 1 Glasses of wine per week     Comment: weekends    Drug use: Never     Social Determinants of Health     Financial Resource Strain: Low Risk     Difficulty of Paying Living Expenses: Not hard at all   Food Insecurity: No Food Insecurity    Worried About Running Out of Food in the Last Year: Never true    Ran Out of Food in the Last Year: Never true   Transportation Needs: No Transportation Needs    Lack of Transportation (Medical): No    Lack of Transportation (Non-Medical): No   Physical Activity: Sufficiently Active    Days of Exercise per Week: 7 days    Minutes of Exercise per Session: 90 min   Stress: No Stress Concern " "Present    Feeling of Stress : Only a little   Social Connections: Socially Integrated    Frequency of Communication with Friends and Family: More than three times a week    Frequency of Social Gatherings with Friends and Family: More than three times a week    Attends [employer] Services: 1 to 4 times per year    Attends Club or Organization Meetings: More than 4 times per year    Marital Status:    Housing Stability: Unknown    Unable to Pay for Housing in the Last Year: No    Unstable Housing in the Last Year: No        Occupation:  for tech, 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      VITAMIN D PO Take  by mouth.      atorvastatin (LIPITOR) 20 MG Tab Take 1 Tablet by mouth every day. 90 Tablet 3    losartan (COZAAR) 100 MG Tab Take 1 Tablet by mouth every day. 90 Each 3     No current facility-administered medications for this visit.       REVIEW OF SYSTEMS  Constitutional: Denies fevers, Denies weight changes  Ears/Nose/Throat/Mouth: Denies nasal congestion or sore throat   Cardiovascular: Denies chest pain or palpitations   Respiratory: Denies shortness of breath, Denies cough  Gastrointestinal/Hepatic: Denies abdominal pain, nausea, vomiting, diarrhea  Genitourinary: Denies nocturia  Musculoskeletal/Rheum: Denies  joint pain and swelling   Skin/Breast: Denies rash  Sleep: See HPI    Physical Examination:  Vitals/ General Appearance:   Weight/BMI: Body mass index is 32.35 kg/m².  Ht 1.88 m (6' 2\")   Wt 114 kg (252 lb)   Vitals:    02/23/23 1011   Weight: 114 kg (252 lb)   Height: 1.88 m (6' 2\")       General: alert and oriented to time, place and person. Cooperative and in no apparent distress.   Constitutional: Alert, no distress, well-groomed.  Voice: normal " volume and last  Head: normocephalic   Pulmonary: Voice normal volume and last. No sounds or signs of increased work of breathing.   Neurologic: No involuntary movements     Medical decision making  The medical record was reviewed. Care gaps identified and reviewed with the patient.    Diagnostic and titration nocturnal polysomnogram's, home sleep apnea tests, continuous nocturnal oximetry results, multiple sleep latency tests, and compliance reports reviewed.    Assessment and Plan:    Kaiser Montalvo is a 47 y.o. male with hypertension, hyperlipidemia, obesity and loud snoring.  Presents to Sleep Clinic for evaluation of snoring.       The patient has signs and symptoms consistent with obstructive sleep apnea syndrome.  His symptoms of sleep apnea include loud snoring, gasping in sleep, and witnessed apneas.  Will schedule a home sleep apnea test.    The risks of untreated sleep apnea were discussed with the patient at length. Patients with DAY are at increased risk of cardiovascular disease including coronary artery disease, systemic arterial hypertension, pulmonary arterial hypertension, cardiac arrythmias, and stroke. DAY patients have an increased risk of motor vehicle accidents, type 2 diabetes, chronic kidney disease, and non-alcoholic liver disease. The patient was advised to avoid driving a motor vehicle when drowsy.    He does have comorbidities which can be impacted by untreated sleep apnea including hypertension and hyperlipidemia.    Positive airway pressure will favorably impact many of the adverse conditions and effects provoked by DAY. Discussed alternatives to PAP therapy such as oral appliance, surgeries related to DAY and that PAP remains the gold standard therapy.     Have advised the patient to follow up with the appropriate healthcare practitioners for all other medical problems and issues.      Return for after sleep study.      Please note portions of this record was created  using voice recognition software. I have made every reasonable attempt to correct obvious errors, but I expect that there are errors of grammar and possibly content I did not discover before finalizing the note.

## 2023-03-09 ENCOUNTER — HOSPITAL ENCOUNTER (OUTPATIENT)
Dept: LAB | Facility: MEDICAL CENTER | Age: 48
End: 2023-03-09
Attending: INTERNAL MEDICINE
Payer: COMMERCIAL

## 2023-03-09 ENCOUNTER — HOSPITAL ENCOUNTER (OUTPATIENT)
Dept: LAB | Facility: MEDICAL CENTER | Age: 48
End: 2023-03-09
Attending: UROLOGY
Payer: COMMERCIAL

## 2023-03-09 DIAGNOSIS — Z13.29 SCREENING FOR THYROID DISORDER: ICD-10-CM

## 2023-03-09 DIAGNOSIS — I10 PRIMARY HYPERTENSION: ICD-10-CM

## 2023-03-09 DIAGNOSIS — E55.9 VITAMIN D DEFICIENCY: ICD-10-CM

## 2023-03-09 DIAGNOSIS — Z13.21 ENCOUNTER FOR VITAMIN DEFICIENCY SCREENING: ICD-10-CM

## 2023-03-09 DIAGNOSIS — R73.03 PREDIABETES: ICD-10-CM

## 2023-03-09 DIAGNOSIS — E78.2 MIXED HYPERLIPIDEMIA: ICD-10-CM

## 2023-03-09 DIAGNOSIS — R79.89 ELEVATED SERUM FREE T4 LEVEL: ICD-10-CM

## 2023-03-09 LAB
25(OH)D3 SERPL-MCNC: 25 NG/ML (ref 30–100)
ALBUMIN SERPL BCP-MCNC: 4.2 G/DL (ref 3.2–4.9)
ALBUMIN/GLOB SERPL: 1.5 G/DL
ALP SERPL-CCNC: 59 U/L (ref 30–99)
ALT SERPL-CCNC: 37 U/L (ref 2–50)
ANION GAP SERPL CALC-SCNC: 10 MMOL/L (ref 7–16)
AST SERPL-CCNC: 20 U/L (ref 12–45)
BILIRUB SERPL-MCNC: 0.5 MG/DL (ref 0.1–1.5)
BUN SERPL-MCNC: 15 MG/DL (ref 8–22)
CALCIUM ALBUM COR SERPL-MCNC: 9.4 MG/DL (ref 8.5–10.5)
CALCIUM SERPL-MCNC: 9.6 MG/DL (ref 8.5–10.5)
CHLORIDE SERPL-SCNC: 104 MMOL/L (ref 96–112)
CHOLEST SERPL-MCNC: 180 MG/DL (ref 100–199)
CO2 SERPL-SCNC: 27 MMOL/L (ref 20–33)
CREAT SERPL-MCNC: 0.82 MG/DL (ref 0.5–1.4)
EST. AVERAGE GLUCOSE BLD GHB EST-MCNC: 108 MG/DL
GFR SERPLBLD CREATININE-BSD FMLA CKD-EPI: 109 ML/MIN/1.73 M 2
GLOBULIN SER CALC-MCNC: 2.8 G/DL (ref 1.9–3.5)
GLUCOSE SERPL-MCNC: 105 MG/DL (ref 65–99)
HBA1C MFR BLD: 5.4 % (ref 4–5.6)
HDLC SERPL-MCNC: 49 MG/DL
LDLC SERPL CALC-MCNC: 98 MG/DL
POTASSIUM SERPL-SCNC: 4.4 MMOL/L (ref 3.6–5.5)
PROT SERPL-MCNC: 7 G/DL (ref 6–8.2)
PSA SERPL-MCNC: 1.47 NG/ML (ref 0–4)
SODIUM SERPL-SCNC: 141 MMOL/L (ref 135–145)
T3FREE SERPL-MCNC: 3.67 PG/ML (ref 2–4.4)
T4 FREE SERPL-MCNC: 1.81 NG/DL (ref 0.93–1.7)
TRIGL SERPL-MCNC: 163 MG/DL (ref 0–149)
TSH SERPL DL<=0.005 MIU/L-ACNC: 1.7 UIU/ML (ref 0.38–5.33)
VIT B12 SERPL-MCNC: 366 PG/ML (ref 211–911)

## 2023-03-09 PROCEDURE — 36415 COLL VENOUS BLD VENIPUNCTURE: CPT

## 2023-03-09 PROCEDURE — 84443 ASSAY THYROID STIM HORMONE: CPT

## 2023-03-09 PROCEDURE — 84481 FREE ASSAY (FT-3): CPT

## 2023-03-09 PROCEDURE — 82607 VITAMIN B-12: CPT

## 2023-03-09 PROCEDURE — 83036 HEMOGLOBIN GLYCOSYLATED A1C: CPT

## 2023-03-09 PROCEDURE — 80053 COMPREHEN METABOLIC PANEL: CPT

## 2023-03-09 PROCEDURE — 84153 ASSAY OF PSA TOTAL: CPT

## 2023-03-09 PROCEDURE — 82306 VITAMIN D 25 HYDROXY: CPT

## 2023-03-09 PROCEDURE — 80061 LIPID PANEL: CPT

## 2023-03-09 PROCEDURE — 84439 ASSAY OF FREE THYROXINE: CPT

## 2023-03-16 ENCOUNTER — APPOINTMENT (OUTPATIENT)
Dept: SLEEP MEDICINE | Facility: MEDICAL CENTER | Age: 48
End: 2023-03-16
Payer: COMMERCIAL

## 2023-03-28 ENCOUNTER — HOME STUDY (OUTPATIENT)
Dept: SLEEP MEDICINE | Facility: MEDICAL CENTER | Age: 48
End: 2023-03-28
Attending: STUDENT IN AN ORGANIZED HEALTH CARE EDUCATION/TRAINING PROGRAM
Payer: COMMERCIAL

## 2023-03-28 DIAGNOSIS — G47.30 SLEEP DISORDER BREATHING: ICD-10-CM

## 2023-03-28 DIAGNOSIS — I10 PRIMARY HYPERTENSION: ICD-10-CM

## 2023-03-28 DIAGNOSIS — E66.9 OBESITY (BMI 30.0-34.9): ICD-10-CM

## 2023-03-28 DIAGNOSIS — R06.83 LOUD SNORING: ICD-10-CM

## 2023-03-28 DIAGNOSIS — R06.81 WITNESSED EPISODE OF APNEA: ICD-10-CM

## 2023-03-28 PROCEDURE — 95800 SLP STDY UNATTENDED: CPT | Performed by: STUDENT IN AN ORGANIZED HEALTH CARE EDUCATION/TRAINING PROGRAM

## 2023-03-30 ENCOUNTER — OFFICE VISIT (OUTPATIENT)
Dept: MEDICAL GROUP | Facility: PHYSICIAN GROUP | Age: 48
End: 2023-03-30
Payer: COMMERCIAL

## 2023-03-30 VITALS
OXYGEN SATURATION: 95 % | HEIGHT: 74 IN | BODY MASS INDEX: 32.08 KG/M2 | DIASTOLIC BLOOD PRESSURE: 82 MMHG | SYSTOLIC BLOOD PRESSURE: 142 MMHG | WEIGHT: 250 LBS | HEART RATE: 81 BPM | RESPIRATION RATE: 14 BRPM | TEMPERATURE: 98.1 F

## 2023-03-30 DIAGNOSIS — G47.33 OSA (OBSTRUCTIVE SLEEP APNEA): ICD-10-CM

## 2023-03-30 DIAGNOSIS — N50.3 EPIDIDYMAL CYST: ICD-10-CM

## 2023-03-30 DIAGNOSIS — N52.9 ERECTILE DYSFUNCTION, UNSPECIFIED ERECTILE DYSFUNCTION TYPE: ICD-10-CM

## 2023-03-30 PROBLEM — R06.83 LOUD SNORING: Status: RESOLVED | Noted: 2022-03-23 | Resolved: 2023-03-30

## 2023-03-30 PROBLEM — Z91.89 AT RISK FOR SLEEP APNEA: Status: RESOLVED | Noted: 2022-03-23 | Resolved: 2023-03-30

## 2023-03-30 PROCEDURE — 99213 OFFICE O/P EST LOW 20 MIN: CPT | Performed by: INTERNAL MEDICINE

## 2023-03-30 RX ORDER — SILDENAFIL 50 MG/1
50 TABLET, FILM COATED ORAL
Qty: 10 TABLET | Refills: 3 | Status: SHIPPED | OUTPATIENT
Start: 2023-03-30 | End: 2023-05-30 | Stop reason: SDUPTHER

## 2023-03-30 NOTE — PROGRESS NOTES
"Established Patient    Chief Complaint   Patient presents with    Follow-Up       Subjective:     HPI:   Kaiser presents today with the following.    Patient Active Problem List    Diagnosis Date Noted    ED (erectile dysfunction) 03/30/2023    Epididymal cyst 03/30/2023    DAY (obstructive sleep apnea) 03/30/2023    Herpes labialis 01/19/2023    Prediabetes 10/13/2022    Vitamin D deficiency 06/28/2022    Primary hypertension 03/23/2022    Mixed hyperlipidemia 03/23/2022    Obesity (BMI 30.0-34.9) 03/23/2022    Diverticulosis of colon 03/23/2022       Current Outpatient Medications on File Prior to Visit   Medication Sig Dispense Refill    valACYclovir (VALTREX) 500 MG Tab Take 500 mg twice daily for 3 days during episodes of herpes labialis 30 Tablet 3    Ascorbic Acid (VITAMIN C PO) Take 500 mg by mouth.      VITAMIN D PO Take  by mouth.      atorvastatin (LIPITOR) 20 MG Tab Take 1 Tablet by mouth every day. 90 Tablet 3    losartan (COZAAR) 100 MG Tab Take 1 Tablet by mouth every day. 90 Each 3     No current facility-administered medications on file prior to visit.       Allergies, past medical history, past surgical history, family history, social history reviewed and updated    ROS:  All other systems reviewed and are negative except as stated in the HPI       Physical Exam:     BP (!) 142/82 (BP Location: Right arm, Patient Position: Sitting, BP Cuff Size: Adult)   Pulse 81   Temp 36.7 °C (98.1 °F) (Temporal)   Resp 14   Ht 1.88 m (6' 2\")   Wt 113 kg (250 lb)   SpO2 95%   BMI 32.10 kg/m²   General: Normal appearing. No distress.  Pulmonary: Clear to ausculation.  Normal effort.   Cardiovascular: Regular rate and rhythm    Assessment and Plan:     47 y.o. male with the following issues.    1. Erectile dysfunction, unspecified erectile dysfunction type  Patient would like to have some Viagra pills for erectile dysfunction, reported this happened last week suddenly, denied having any mental issues or any " organic reason for that, would like to have a trial of Viagra and go from there, explained to the patient significant complication as well.  Patient understands  - sildenafil citrate (VIAGRA) 50 MG tablet; Take 1 Tablet by mouth 1 time a day as needed for Erectile Dysfunction.  Dispense: 10 Tablet; Refill: 3    2. Epididymal cyst  Ultrasound 2023:   Large 4.2 cm mildly complicated cyst head of the left epididymis.  Reported will have surgery with urology in April 2023, currently denied having symptoms    3. DAY (obstructive sleep apnea)  Recently done sleep study with evidence of severe sleep apnea, patient will follow-up with the sleep medicine in May 2023.    Discussed in details typical signs and symptoms of obstructive sleep apnea. And that is not treating sleep apnea can result in multiple health problems. Particularly emphasized the cardiovascular risks.     Pt is urged to avoid supine sleep, weight gain and alcoholic beverages since all of these can worsen sleep apnea. He is cautioned against drowsy driving. If He feels sleepy while driving, He must pull over for a break/nap, rather than persist on the road, in the interest of He own safety and that of others on the road.    Please note that this dictation was created using voice recognition software. I have made every reasonable attempt to correct obvious errors, but I expect that there are errors of grammar and possibly content that I did not discover before finalizing the note.    Signed by: Barb Day M.D.

## 2023-04-04 NOTE — PROCEDURES
DIAGNOSTIC HOME SLEEP TEST (HST) REPORT WatchPAT      PATIENT ID:  NAME:  Kaiser Montalvo  MRN:               [id number]  YOB: 1975  DATE OF STUDY: 03/28/2023      Impression:     This study shows evidence of:      1. Severe obstructive sleep apnea with PAT apnea hypopnea index(pAHI) of 51.8 per hour.  PAT respiratory disturbance index (pRDI) was 52.1 per hour. These findings are based on 7 channels recording of PAT signal with sleep staging, heart rate, pulse oximetry, actigraphy, body position, snoring and respiratory movement.     2. Oxygenation O2 Sat. mean O2 sat was 91%,  radha was 81%,  and maximum O2 at 97 %. O2 sat was at or  below 88% for 20.1 min of evaluation time. Oxygen Desaturation (>=4%) Index was 35.7/hr. AVG HR was 80 BPM.      TECHNICAL DESCRIPTION: Patient underwent home sleep apnea testing with peripheral arterial tone signal (WatchPAT™). This is a Type IV portable monitor and device per Medicare. Monitoring was done with 7 channels recording of PAT signal with sleep staging, heart rate, pulse oximetry, actigraphy, body position, snoring and respiratory movement. Prior to using the device, the patient received verbal and written instructions for its application and was provided with the help desk phone number for additional telephonic instruction with 24-hour availability of qualified personnel to answer questions.    Respiratory events:    pRDI: Total 356 (REM index 32/hr. NREM index 55.8/hr, All Night 52.1/hr)    3% pAHI: Total 354 (REM index 31/hr. NREM index 55.6/hr, All Night 51.8/hr)    3% pAHIc: Total 39 ( All Night 5.9/hr)    4% pAHI: Total 261 (All Night 38.2/hr)    General sleep summary: . Total recording time is 8 hours and 2 minutes and total Sleep time is 7 hours and 17 minutes. The patient spent 401 minutes in the supine position and 36 minutes in the nonsupine position.      Recommendations:    1. CPAP titration study vs Auto CPAP trial .   2.   In general  patients with sleep apnea are advised to avoid alcohol and sedatives and to not operate a motor vehicle while drowsy. In some cases alternative treatment options may prove effective in resolving sleep apnea in these options include upper airway surgery, the use of a dental orthotic or weight loss and positional therapy. Clinical correlation is required.         Chito Sutherland MD

## 2023-05-09 ENCOUNTER — TELEMEDICINE (OUTPATIENT)
Dept: SLEEP MEDICINE | Facility: MEDICAL CENTER | Age: 48
End: 2023-05-09
Attending: STUDENT IN AN ORGANIZED HEALTH CARE EDUCATION/TRAINING PROGRAM
Payer: COMMERCIAL

## 2023-05-09 VITALS — WEIGHT: 250 LBS | BODY MASS INDEX: 32.08 KG/M2 | HEIGHT: 74 IN

## 2023-05-09 DIAGNOSIS — G47.33 OSA (OBSTRUCTIVE SLEEP APNEA): Primary | ICD-10-CM

## 2023-05-09 DIAGNOSIS — R06.81 WITNESSED EPISODE OF APNEA: ICD-10-CM

## 2023-05-09 DIAGNOSIS — R06.83 LOUD SNORING: ICD-10-CM

## 2023-05-09 DIAGNOSIS — I10 PRIMARY HYPERTENSION: ICD-10-CM

## 2023-05-09 PROCEDURE — 99213 OFFICE O/P EST LOW 20 MIN: CPT | Mod: 95 | Performed by: STUDENT IN AN ORGANIZED HEALTH CARE EDUCATION/TRAINING PROGRAM

## 2023-05-09 ASSESSMENT — FIBROSIS 4 INDEX: FIB4 SCORE: 0.58

## 2023-05-09 NOTE — PROGRESS NOTES
[facility] Virtual Follow Up Note     Date: 2023 / Time: 12:59 PM    CC: Telemedicine sleep follow-up    This sleep consultation is provided using Telemedicine and secure encrypted software. Consent was obtained.    Consulting MD: Chito Sutherland M.D.  Requesting Physician: Barb Day M.D.  Patient name:      Kaiser Montalvo  : 1975  MRN: [id number]     This visit was conducted via Zoom using secure and encrypted videoconferencing technology.   The patient was in a private location [place] [facility]    The patient's identity was confirmed and verbal consent was obtained for this virtual visit.      HISTORY OF PRESENT ILLNESS:     Kaiser Montalvo is a 47 y.o. male with hypertension, hyperlipidemia, prediabetes, obesity, and severe obstructive sleep apnea nocturnal hypoxia.  Presents today to discuss recent sleep study results.    He reports neither sleep study was not the best night sleep.  He felt his sleep is very disrupted during the night of the study.  He did see the study results prior to today's visit.    At last visit it was discussed that he has loud snoring in his sleep.  In addition to the snoring he has been told by his wife that he does gasp in his sleep and have pauses in breathing.  Overall he finds his sleep restorative.  Denies any excessive daytime sleepiness or fatigue.  Continues to exercise regularly.      MEDICAL HISTORY  History reviewed. No pertinent past medical history.   Patient Active Problem List   Diagnosis    Primary hypertension    Mixed hyperlipidemia    Obesity (BMI 30.0-34.9)    Diverticulosis of colon    Vitamin D deficiency    Prediabetes    Herpes labialis    ED (erectile dysfunction)    Epididymal cyst    DAY (obstructive sleep apnea)         SURGICAL HISTORY  History reviewed. No pertinent surgical history.     FAMILY HISTORY  History reviewed. No pertinent family history.    SOCIAL HISTORY  Social History     Socioeconomic  "History    Marital status:     Highest education level: Professional school degree (e.g., MD, DDS, DVM, KIN)   Tobacco Use    Smoking status: Never    Smokeless tobacco: Never   Vaping Use    Vaping Use: Never used   Substance and Sexual Activity    Alcohol use: Yes     Alcohol/week: 0.6 oz     Types: 1 Glasses of wine per week     Comment: weekends    Drug use: Never        CURRENT MEDICATIONS  Current Outpatient Medications   Medication Sig Dispense Refill    sildenafil citrate (VIAGRA) 50 MG tablet Take 1 Tablet by mouth 1 time a day as needed for Erectile Dysfunction. 10 Tablet 3    valACYclovir (VALTREX) 500 MG Tab Take 500 mg twice daily for 3 days during episodes of herpes labialis 30 Tablet 3    Ascorbic Acid (VITAMIN C PO) Take 500 mg by mouth.      VITAMIN D PO Take  by mouth.      atorvastatin (LIPITOR) 20 MG Tab Take 1 Tablet by mouth every day. 90 Tablet 3    losartan (COZAAR) 100 MG Tab Take 1 Tablet by mouth every day. 90 Each 3     No current facility-administered medications for this visit.       REVIEW OF SYSTEMS  Constitutional: Denies fevers, Denies weight changes  Ears/Nose/Throat/Mouth: Denies nasal congestion or sore throat   Cardiovascular: Denies chest pain or palpitations   Respiratory: Denies shortness of breath, Denies cough  Gastrointestinal/Hepatic: Denies abdominal pain, nausea, vomiting, diarrhea  Musculoskeletal/Rheum: Denies  joint pain and swelling   Sleep: See HPI      Physical Examination:  Vitals/ General Appearance:   Weight/BMI: Body mass index is 32.1 kg/m².  Ht 1.88 m (6' 2\")   Wt 113 kg (250 lb)   Vitals:    05/09/23 1258   Weight: 113 kg (250 lb)   Height: 1.88 m (6' 2\")       General: alert and oriented to time, place and person. Cooperative and in no apparent distress.   Constitutional: Alert, no distress, well-groomed.  Voice: normal volume and last  Head: normocephalic   Pulmonary: Voice normal volume and last. No sounds or signs of increased work of " breathing.   Neurologic: No involuntary movements     Assessment and Plan  Kaiser Montalvo is a 47 y.o. male with hypertension, hyperlipidemia, prediabetes, obesity, and severe obstructive sleep apnea nocturnal hypoxia.  Presents today to discuss recent sleep study results.    The medical record was reviewed.    Obstructive sleep apnea   Reviewed recent HST with patient showing an AHI of 51.8,  and Min Oxygen saturation of 81% %.  Time at or below 88% saturation 20 minutes minutes  Based on sleep study and symptoms meets criteria for severe obstructive sleep apnea.   We discussed the pathophysiology of obstructive sleep apnea (DAY) and risk factors for the disease. We also discussed possible consequences of untreated DAY, including excessive daytime sleepiness and fatigue, cognitive dysfunction, cardiovascular complications such as elevated blood pressure, heart attacks, cardiac arrhythmias, and strokes. We discussed how DAY typically gets worse with age. We discussed treatment options for DAY, including the gold standard therapy (PAP), alternative options such as a mandibular advancement device (custom-made oral appliances) and surgeries. We will proceed CPAP therapy.     RECOMMENDATIONS  -Start Auto CPAP at pressures 5-15 cm H2O  -Discussed importance of adherence/compliance   -Prescription generated for supplies   -Patient counseled to avoid driving when sleepy. Encouraged to anticipate sleepiness, consider taking a 10 min nap prior to driving, alternate with another , or pull over if sleepy while driving  -Advised to contact our office or myself with any questions via MyChart  -Follow up in 3 months or sooner if needed      Positive airway pressure will favorably impact many of the adverse conditions and effects provoked by DAY.    Have advised the patient to follow up with the appropriate healthcare practitioners for all other medical problems and issues.    Return in about 3 months (around  8/9/2023).      Please note portions of this record was created using voice recognition software. I have made every reasonable attempt to correct obvious errors, but I expect that there are errors of grammar and possibly content I did not discover before finalizing the note.

## 2023-05-23 ENCOUNTER — TELEPHONE (OUTPATIENT)
Dept: SCHEDULING | Facility: IMAGING CENTER | Age: 48
End: 2023-05-23

## 2023-05-25 DIAGNOSIS — E78.2 MIXED HYPERLIPIDEMIA: ICD-10-CM

## 2023-05-25 RX ORDER — ATORVASTATIN CALCIUM 20 MG/1
TABLET, FILM COATED ORAL
Qty: 90 TABLET | Refills: 0 | Status: SHIPPED | OUTPATIENT
Start: 2023-05-25 | End: 2023-05-30 | Stop reason: SDUPTHER

## 2023-05-29 SDOH — ECONOMIC STABILITY: HOUSING INSECURITY: IN THE LAST 12 MONTHS, HOW MANY PLACES HAVE YOU LIVED?: 1

## 2023-05-29 SDOH — HEALTH STABILITY: PHYSICAL HEALTH: ON AVERAGE, HOW MANY MINUTES DO YOU ENGAGE IN EXERCISE AT THIS LEVEL?: 60 MIN

## 2023-05-29 SDOH — HEALTH STABILITY: PHYSICAL HEALTH: ON AVERAGE, HOW MANY DAYS PER WEEK DO YOU ENGAGE IN MODERATE TO STRENUOUS EXERCISE (LIKE A BRISK WALK)?: 5 DAYS

## 2023-05-29 SDOH — ECONOMIC STABILITY: FOOD INSECURITY: WITHIN THE PAST 12 MONTHS, YOU WORRIED THAT YOUR FOOD WOULD RUN OUT BEFORE YOU GOT MONEY TO BUY MORE.: NEVER TRUE

## 2023-05-29 SDOH — ECONOMIC STABILITY: INCOME INSECURITY: IN THE LAST 12 MONTHS, WAS THERE A TIME WHEN YOU WERE NOT ABLE TO PAY THE MORTGAGE OR RENT ON TIME?: NO

## 2023-05-29 SDOH — ECONOMIC STABILITY: INCOME INSECURITY: HOW HARD IS IT FOR YOU TO PAY FOR THE VERY BASICS LIKE FOOD, HOUSING, MEDICAL CARE, AND HEATING?: NOT HARD AT ALL

## 2023-05-29 SDOH — HEALTH STABILITY: MENTAL HEALTH
STRESS IS WHEN SOMEONE FEELS TENSE, NERVOUS, ANXIOUS, OR CAN'T SLEEP AT NIGHT BECAUSE THEIR MIND IS TROUBLED. HOW STRESSED ARE YOU?: NOT AT ALL

## 2023-05-29 SDOH — ECONOMIC STABILITY: FOOD INSECURITY: WITHIN THE PAST 12 MONTHS, THE FOOD YOU BOUGHT JUST DIDN'T LAST AND YOU DIDN'T HAVE MONEY TO GET MORE.: NEVER TRUE

## 2023-05-29 ASSESSMENT — LIFESTYLE VARIABLES
SKIP TO QUESTIONS 9-10: 1
HOW MANY STANDARD DRINKS CONTAINING ALCOHOL DO YOU HAVE ON A TYPICAL DAY: 1 OR 2
AUDIT-C TOTAL SCORE: 3
HOW OFTEN DO YOU HAVE SIX OR MORE DRINKS ON ONE OCCASION: NEVER
HOW OFTEN DO YOU HAVE A DRINK CONTAINING ALCOHOL: 2-3 TIMES A WEEK

## 2023-05-29 ASSESSMENT — SOCIAL DETERMINANTS OF HEALTH (SDOH)
HOW OFTEN DO YOU GET TOGETHER WITH FRIENDS OR RELATIVES?: MORE THAN THREE TIMES A WEEK
IN A TYPICAL WEEK, HOW MANY TIMES DO YOU TALK ON THE PHONE WITH FAMILY, FRIENDS, OR NEIGHBORS?: MORE THAN THREE TIMES A WEEK
HOW OFTEN DO YOU ATTEND CHURCH OR RELIGIOUS SERVICES?: 1 TO 4 TIMES PER YEAR
HOW MANY DRINKS CONTAINING ALCOHOL DO YOU HAVE ON A TYPICAL DAY WHEN YOU ARE DRINKING: 1 OR 2
HOW OFTEN DO YOU ATTEND CHURCH OR RELIGIOUS SERVICES?: 1 TO 4 TIMES PER YEAR
HOW OFTEN DO YOU GET TOGETHER WITH FRIENDS OR RELATIVES?: MORE THAN THREE TIMES A WEEK
IN A TYPICAL WEEK, HOW MANY TIMES DO YOU TALK ON THE PHONE WITH FAMILY, FRIENDS, OR NEIGHBORS?: MORE THAN THREE TIMES A WEEK
HOW HARD IS IT FOR YOU TO PAY FOR THE VERY BASICS LIKE FOOD, HOUSING, MEDICAL CARE, AND HEATING?: NOT HARD AT ALL
HOW OFTEN DO YOU ATTENT MEETINGS OF THE CLUB OR ORGANIZATION YOU BELONG TO?: PATIENT DECLINED
DO YOU BELONG TO ANY CLUBS OR ORGANIZATIONS SUCH AS CHURCH GROUPS UNIONS, FRATERNAL OR ATHLETIC GROUPS, OR SCHOOL GROUPS?: PATIENT DECLINED
HOW OFTEN DO YOU HAVE SIX OR MORE DRINKS ON ONE OCCASION: NEVER
HOW OFTEN DO YOU ATTENT MEETINGS OF THE CLUB OR ORGANIZATION YOU BELONG TO?: PATIENT DECLINED
HOW OFTEN DO YOU HAVE A DRINK CONTAINING ALCOHOL: 2-3 TIMES A WEEK
DO YOU BELONG TO ANY CLUBS OR ORGANIZATIONS SUCH AS CHURCH GROUPS UNIONS, FRATERNAL OR ATHLETIC GROUPS, OR SCHOOL GROUPS?: PATIENT DECLINED
WITHIN THE PAST 12 MONTHS, YOU WORRIED THAT YOUR FOOD WOULD RUN OUT BEFORE YOU GOT THE MONEY TO BUY MORE: NEVER TRUE

## 2023-05-30 ENCOUNTER — OFFICE VISIT (OUTPATIENT)
Dept: MEDICAL GROUP | Facility: PHYSICIAN GROUP | Age: 48
End: 2023-05-30
Payer: COMMERCIAL

## 2023-05-30 VITALS
HEART RATE: 65 BPM | BODY MASS INDEX: 33.07 KG/M2 | SYSTOLIC BLOOD PRESSURE: 142 MMHG | WEIGHT: 257.7 LBS | DIASTOLIC BLOOD PRESSURE: 90 MMHG | HEIGHT: 74 IN | OXYGEN SATURATION: 95 % | TEMPERATURE: 98 F

## 2023-05-30 DIAGNOSIS — N50.3 EPIDIDYMAL CYST: ICD-10-CM

## 2023-05-30 DIAGNOSIS — G47.33 OSA (OBSTRUCTIVE SLEEP APNEA): ICD-10-CM

## 2023-05-30 DIAGNOSIS — B00.1 HERPES LABIALIS: ICD-10-CM

## 2023-05-30 DIAGNOSIS — E78.2 MIXED HYPERLIPIDEMIA: ICD-10-CM

## 2023-05-30 DIAGNOSIS — R73.03 PREDIABETES: ICD-10-CM

## 2023-05-30 DIAGNOSIS — E55.9 VITAMIN D DEFICIENCY: ICD-10-CM

## 2023-05-30 DIAGNOSIS — I10 PRIMARY HYPERTENSION: ICD-10-CM

## 2023-05-30 DIAGNOSIS — N52.9 ERECTILE DYSFUNCTION, UNSPECIFIED ERECTILE DYSFUNCTION TYPE: ICD-10-CM

## 2023-05-30 DIAGNOSIS — Z23 NEED FOR VACCINATION: ICD-10-CM

## 2023-05-30 PROCEDURE — 99214 OFFICE O/P EST MOD 30 MIN: CPT | Mod: 25 | Performed by: FAMILY MEDICINE

## 2023-05-30 PROCEDURE — 3077F SYST BP >= 140 MM HG: CPT | Performed by: FAMILY MEDICINE

## 2023-05-30 PROCEDURE — 90471 IMMUNIZATION ADMIN: CPT | Performed by: FAMILY MEDICINE

## 2023-05-30 PROCEDURE — 90715 TDAP VACCINE 7 YRS/> IM: CPT | Performed by: FAMILY MEDICINE

## 2023-05-30 PROCEDURE — 3080F DIAST BP >= 90 MM HG: CPT | Performed by: FAMILY MEDICINE

## 2023-05-30 RX ORDER — CHOLECALCIFEROL (VITAMIN D3) 50 MCG
2000 TABLET ORAL DAILY
Qty: 90 TABLET | Refills: 3 | Status: SHIPPED | OUTPATIENT
Start: 2023-05-30

## 2023-05-30 RX ORDER — SILDENAFIL 50 MG/1
50 TABLET, FILM COATED ORAL
Qty: 10 TABLET | Refills: 3 | Status: SHIPPED | OUTPATIENT
Start: 2023-05-30 | End: 2023-05-31 | Stop reason: SDUPTHER

## 2023-05-30 RX ORDER — ATORVASTATIN CALCIUM 20 MG/1
20 TABLET, FILM COATED ORAL DAILY
Qty: 90 TABLET | Refills: 3 | Status: SHIPPED | OUTPATIENT
Start: 2023-05-30 | End: 2024-01-28 | Stop reason: SDUPTHER

## 2023-05-30 RX ORDER — ASCORBIC ACID 500 MG
500 TABLET ORAL DAILY
Qty: 90 TABLET | Refills: 3 | Status: SHIPPED | OUTPATIENT
Start: 2023-05-30

## 2023-05-30 RX ORDER — VALACYCLOVIR HYDROCHLORIDE 500 MG/1
TABLET, FILM COATED ORAL
Qty: 30 TABLET | Refills: 3 | Status: SHIPPED | OUTPATIENT
Start: 2023-05-30

## 2023-05-30 RX ORDER — LOSARTAN POTASSIUM 100 MG/1
100 TABLET ORAL DAILY
Qty: 90 TABLET | Refills: 3 | Status: SHIPPED | OUTPATIENT
Start: 2023-05-30

## 2023-05-30 RX ORDER — AMLODIPINE BESYLATE 5 MG/1
5 TABLET ORAL DAILY
Qty: 90 TABLET | Refills: 3 | Status: SHIPPED | OUTPATIENT
Start: 2023-05-30 | End: 2024-01-30 | Stop reason: SINTOL

## 2023-05-30 ASSESSMENT — FIBROSIS 4 INDEX: FIB4 SCORE: 0.58

## 2023-05-30 NOTE — PROGRESS NOTES
"CHIEF COMPLAINT / REASON FOR VISIT  Kaiser Montalvo is a 47 y.o. male that presents today to establish care.    HISTORY OF PRESENT ILLNESS  Generally feels well.    Feb 2023, swelling left testicle. Had left spermatocelectomy 4/20/23. Still has swelling and pain though is improving. Still following with urology, they are going to repeat US.    Checks blood pressures at home in the morning, run usually 130s/80s.     Past Medical History  Hyperlipidemia-atorvastatin 20 mg daily  Erectile dysfunction-sildenafil 50 mg as needed  Herpes simplex labialis-as needed Valtrex 500 mg twice daily x3 days  Hypertension-losartan 100 mg daily  DAY-on CPAP    Past Surgical History:   Procedure Laterality Date    TESTICLE EXPLORATION Left 04/20/2023     Social History     Tobacco Use    Smoking status: Never    Smokeless tobacco: Never   Vaping Use    Vaping Use: Never used   Substance Use Topics    Alcohol use: Yes     Alcohol/week: 0.6 oz     Types: 1 Glasses of wine per week     Comment: weekends    Drug use: Never     OBJECTIVE    BP (!) 142/90 (BP Location: Right arm, Patient Position: Sitting, BP Cuff Size: Adult)   Pulse 65   Temp 36.7 °C (98 °F) (Temporal)   Ht 1.88 m (6' 2\")   Wt 117 kg (257 lb 11.2 oz)   SpO2 95%   BMI 33.09 kg/m²      PHYSICAL EXAM  Constitutional: Sitting comfortably, in no acute distress, responds to questions appropriately.  Head: Normocephalic  Eyes:  No conjunctival injection, no scleral icterus, PERRL  Ears: External ear canals clear, TMs pearly grey with visualized bony landmarks and crisp light reflex  Mouth: Oral mucosa moist. Good dentition  Throat: Oropharynx clear without erythema or tonsillar exudates  Neck: No cervical lymphadenopathy  Heart: Regular S1 S2, no murmurs, rub, or gallops  Lungs: Clear to auscultation bilaterally, no wheezes, rales, or rhonchi  Extremities: No lower extremity edema  Skin: Warm and dry, no rashes or lesions on face or exposed upper " extremities    ASSESSMENT & PLAN  1. Primary hypertension  Uncontrolled on losartan 100 mg daily.  Goal less than 130/80.  We will start amlodipine 5 mg daily and he will continue to monitor his pressures at home.  If above goal he will let me know and we can increase his amlodipine dose  - losartan (COZAAR) 100 MG Tab; Take 1 Tablet by mouth every day.  Dispense: 90 Tablet; Refill: 3    2. Vitamin D deficiency  Vitamin D deficiency with level of 25.  Continue vitamin D3 supplementation with 2000 units daily  - Cholecalciferol (VITAMIN D) 2000 UNIT Tab; Take 1 Tablet by mouth every day.  Dispense: 90 Tablet; Refill: 3    3. Prediabetes  Last hemoglobin A1c normal at 5.4.  Continue healthy lifestyle recommendations.    4. DAY (obstructive sleep apnea)  Recent diagnosis, is going to  his CPAP machine next week    5. Mixed hyperlipidemia  LDL-C at goal of less than 100.  Continue atorvastatin 20 mg daily  - atorvastatin (LIPITOR) 20 MG Tab; Take 1 Tablet by mouth every day.  Dispense: 90 Tablet; Refill: 3    6. Epididymal cyst  S/p left spermatocelectomy 4/20/23. Still has swelling and pain though is improving. Following with urology, they are going to repeat scrotal US.    7. Herpes labialis  He reports that usually 1000 mg of Valtrex for 1 day is sufficient to yuan off an impending breakout.  Continue current therapy  - valACYclovir (VALTREX) 500 MG Tab; Take 500 mg twice daily for 3 days during episodes of herpes labialis  Dispense: 30 Tablet; Refill: 3    8. Erectile dysfunction, unspecified erectile dysfunction type  Continue to use as needed sildenafil  - sildenafil citrate (VIAGRA) 50 MG tablet; Take 1 Tablet by mouth 1 time a day as needed for Erectile Dysfunction.  Dispense: 10 Tablet; Refill: 3    9. Need for vaccination  - Tdap Vaccine =>8YO IM      At least annual visit

## 2023-06-20 ENCOUNTER — HOSPITAL ENCOUNTER (OUTPATIENT)
Dept: RADIOLOGY | Facility: MEDICAL CENTER | Age: 48
End: 2023-06-20
Attending: STUDENT IN AN ORGANIZED HEALTH CARE EDUCATION/TRAINING PROGRAM
Payer: COMMERCIAL

## 2023-06-20 DIAGNOSIS — N50.3 CYST OF EPIDIDYMIS: ICD-10-CM

## 2023-06-20 PROCEDURE — 76870 US EXAM SCROTUM: CPT

## 2024-01-28 DIAGNOSIS — E78.2 MIXED HYPERLIPIDEMIA: ICD-10-CM

## 2024-01-29 RX ORDER — ATORVASTATIN CALCIUM 20 MG/1
20 TABLET, FILM COATED ORAL DAILY
Qty: 90 TABLET | Refills: 3 | Status: SHIPPED | OUTPATIENT
Start: 2024-01-29

## 2024-01-29 NOTE — TELEPHONE ENCOUNTER
Received request via: Pharmacy    Was the patient seen in the last year in this department? Yes    Does the patient have an active prescription (recently filled or refills available) for medication(s) requested? No    Pharmacy Name: [facility],[place]     Does the patient have custodial Plus and need 100 day supply (blood pressure, diabetes and cholesterol meds only)? Patient does not have SCP

## 2024-01-30 ENCOUNTER — OFFICE VISIT (OUTPATIENT)
Dept: MEDICAL GROUP | Facility: PHYSICIAN GROUP | Age: 49
End: 2024-01-30
Payer: COMMERCIAL

## 2024-01-30 VITALS
RESPIRATION RATE: 14 BRPM | DIASTOLIC BLOOD PRESSURE: 80 MMHG | HEIGHT: 74 IN | TEMPERATURE: 98.1 F | WEIGHT: 259 LBS | SYSTOLIC BLOOD PRESSURE: 128 MMHG | HEART RATE: 75 BPM | OXYGEN SATURATION: 97 % | BODY MASS INDEX: 33.24 KG/M2

## 2024-01-30 DIAGNOSIS — E78.2 MIXED HYPERLIPIDEMIA: ICD-10-CM

## 2024-01-30 DIAGNOSIS — I10 PRIMARY HYPERTENSION: ICD-10-CM

## 2024-01-30 DIAGNOSIS — F51.02 ADJUSTMENT INSOMNIA: ICD-10-CM

## 2024-01-30 DIAGNOSIS — Z12.5 PROSTATE CANCER SCREENING: ICD-10-CM

## 2024-01-30 DIAGNOSIS — Z11.59 NEED FOR HEPATITIS C SCREENING TEST: ICD-10-CM

## 2024-01-30 DIAGNOSIS — Z23 NEED FOR VACCINATION: ICD-10-CM

## 2024-01-30 DIAGNOSIS — Z11.4 ENCOUNTER FOR SCREENING FOR HIV: ICD-10-CM

## 2024-01-30 DIAGNOSIS — E55.9 VITAMIN D DEFICIENCY: ICD-10-CM

## 2024-01-30 PROCEDURE — 3074F SYST BP LT 130 MM HG: CPT | Performed by: FAMILY MEDICINE

## 2024-01-30 PROCEDURE — 90686 IIV4 VACC NO PRSV 0.5 ML IM: CPT | Performed by: FAMILY MEDICINE

## 2024-01-30 PROCEDURE — 3079F DIAST BP 80-89 MM HG: CPT | Performed by: FAMILY MEDICINE

## 2024-01-30 PROCEDURE — 99214 OFFICE O/P EST MOD 30 MIN: CPT | Mod: 25 | Performed by: FAMILY MEDICINE

## 2024-01-30 PROCEDURE — 90471 IMMUNIZATION ADMIN: CPT | Performed by: FAMILY MEDICINE

## 2024-01-30 RX ORDER — ESZOPICLONE 2 MG/1
2 TABLET, FILM COATED ORAL NIGHTLY PRN
Qty: 30 TABLET | Refills: 0 | Status: SHIPPED | OUTPATIENT
Start: 2024-01-30 | End: 2024-02-29

## 2024-01-30 ASSESSMENT — PATIENT HEALTH QUESTIONNAIRE - PHQ9: CLINICAL INTERPRETATION OF PHQ2 SCORE: 0

## 2024-01-30 ASSESSMENT — FIBROSIS 4 INDEX: FIB4 SCORE: 0.6

## 2024-01-30 NOTE — PROGRESS NOTES
"CHIEF COMPLAINT / REASON FOR VISIT  Kaiser Montalvo is a 48 y.o. male that presents today for   Chief Complaint   Patient presents with    Annual Exam     HISTORY OF PRESENT ILLNESS  Traveling a lot for work, lots of flights around the [place], even [place]    At home usually has no trouble with sleep. Has difficulty with the shift in time zones    OBJECTIVE     /80 (BP Location: Left arm, Patient Position: Sitting, BP Cuff Size: Adult)   Pulse 75   Temp 36.7 °C (98.1 °F) (Temporal)   Resp 14   Ht 1.88 m (6' 2\")   Wt 117 kg (259 lb)   SpO2 97%   BMI 33.25 kg/m²      PHYSICAL EXAM  Constitutional: Sitting comfortably, in no acute distress, responds to questions appropriately.  Eyes:  No conjunctival injection, no scleral icterus, PERRL  Neck: No cervical lymphadenopathy  Mouth: oral mucosa moist  Throat: Oropharynx clear  Heart: Regular S1 S2, no murmurs, rub, or gallops  Lungs: Clear to auscultation bilaterally, no wheezes, rales, or rhonchi  Extremities: No lower extremity edema  Skin: Warm and dry, no rashes or lesions on face or exposed upper extremities    ASSESSMENT & PLAN  1. Adjustment insomnia  Insomnia due to changing time zones when traveling.  Previously had benefit with zopiclone, which was prescribed when he was in [place].  This is not available in the [place], so we will switch to eszopiclone 2 mg nightly PRN  - Eszopiclone (LUNESTA) 2 MG Tab; Take 1 Tablet by mouth at bedtime as needed (insomnia) for up to 30 days. Indications: Trouble Sleeping  Dispense: 30 Tablet; Refill: 0    2. Mixed hyperlipidemia  Chronic, controlled with atorvastatin 20 mg daily, continue current therapy  - Lipid Profile; Future    3. Primary hypertension  Chronic, controlled with losartan 100 mg daily, continue current therapy  - TSH+FREE T4  - CBC WITH DIFFERENTIAL; Future  - Comp Metabolic Panel; Future    4. Vitamin D deficiency  - VITAMIN D,25 HYDROXY (DEFICIENCY); Future    5. Prostate cancer screening  - PROSTATE " SPECIFIC AG SCREENING; Future    6. Need for vaccination  - INFLUENZA VACCINE QUAD INJ (PF)    7. Need for hepatitis C screening test  - HEP C VIRUS ANTIBODY; Future    8. Encounter for screening for HIV  - HIV AG/AB COMBO ASSAY SCREENING; Future    Will inform patient of lab results via [facility], unless requiring in person visit

## 2024-03-06 ENCOUNTER — PATIENT MESSAGE (OUTPATIENT)
Dept: MEDICAL GROUP | Facility: PHYSICIAN GROUP | Age: 49
End: 2024-03-06
Payer: COMMERCIAL

## 2024-03-06 DIAGNOSIS — F51.02 ADJUSTMENT INSOMNIA: ICD-10-CM

## 2024-03-07 RX ORDER — ESZOPICLONE 3 MG/1
3 TABLET, FILM COATED ORAL NIGHTLY PRN
Qty: 30 TABLET | Refills: 0 | Status: SHIPPED | OUTPATIENT
Start: 2024-03-07 | End: 2024-04-06

## 2024-03-20 ENCOUNTER — HOSPITAL ENCOUNTER (OUTPATIENT)
Dept: LAB | Facility: MEDICAL CENTER | Age: 49
End: 2024-03-20
Attending: FAMILY MEDICINE
Payer: COMMERCIAL

## 2024-03-20 DIAGNOSIS — Z11.4 ENCOUNTER FOR SCREENING FOR HIV: ICD-10-CM

## 2024-03-20 DIAGNOSIS — E55.9 VITAMIN D DEFICIENCY: ICD-10-CM

## 2024-03-20 DIAGNOSIS — Z11.59 NEED FOR HEPATITIS C SCREENING TEST: ICD-10-CM

## 2024-03-20 DIAGNOSIS — E78.2 MIXED HYPERLIPIDEMIA: ICD-10-CM

## 2024-03-20 DIAGNOSIS — Z12.5 PROSTATE CANCER SCREENING: ICD-10-CM

## 2024-03-20 DIAGNOSIS — I10 PRIMARY HYPERTENSION: ICD-10-CM

## 2024-03-20 LAB
25(OH)D3 SERPL-MCNC: 23 NG/ML (ref 30–100)
ALBUMIN SERPL BCP-MCNC: 4.5 G/DL (ref 3.2–4.9)
ALBUMIN/GLOB SERPL: 1.9 G/DL
ALP SERPL-CCNC: 62 U/L (ref 30–99)
ALT SERPL-CCNC: 34 U/L (ref 2–50)
ANION GAP SERPL CALC-SCNC: 12 MMOL/L (ref 7–16)
AST SERPL-CCNC: 23 U/L (ref 12–45)
BASOPHILS # BLD AUTO: 0.5 % (ref 0–1.8)
BASOPHILS # BLD: 0.03 K/UL (ref 0–0.12)
BILIRUB SERPL-MCNC: 0.6 MG/DL (ref 0.1–1.5)
BUN SERPL-MCNC: 14 MG/DL (ref 8–22)
CALCIUM ALBUM COR SERPL-MCNC: 8.8 MG/DL (ref 8.5–10.5)
CALCIUM SERPL-MCNC: 9.2 MG/DL (ref 8.5–10.5)
CHLORIDE SERPL-SCNC: 103 MMOL/L (ref 96–112)
CHOLEST SERPL-MCNC: 167 MG/DL (ref 100–199)
CO2 SERPL-SCNC: 26 MMOL/L (ref 20–33)
CREAT SERPL-MCNC: 0.84 MG/DL (ref 0.5–1.4)
EOSINOPHIL # BLD AUTO: 0.28 K/UL (ref 0–0.51)
EOSINOPHIL NFR BLD: 4.4 % (ref 0–6.9)
ERYTHROCYTE [DISTWIDTH] IN BLOOD BY AUTOMATED COUNT: 45 FL (ref 35.9–50)
GFR SERPLBLD CREATININE-BSD FMLA CKD-EPI: 107 ML/MIN/1.73 M 2
GLOBULIN SER CALC-MCNC: 2.4 G/DL (ref 1.9–3.5)
GLUCOSE SERPL-MCNC: 99 MG/DL (ref 65–99)
HCT VFR BLD AUTO: 46.8 % (ref 42–52)
HCV AB SER QL: NORMAL
HDLC SERPL-MCNC: 57 MG/DL
HGB BLD-MCNC: 15.5 G/DL (ref 14–18)
HIV 1+2 AB+HIV1 P24 AG SERPL QL IA: NORMAL
IMM GRANULOCYTES # BLD AUTO: 0.01 K/UL (ref 0–0.11)
IMM GRANULOCYTES NFR BLD AUTO: 0.2 % (ref 0–0.9)
LDLC SERPL CALC-MCNC: 82 MG/DL
LYMPHOCYTES # BLD AUTO: 2.6 K/UL (ref 1–4.8)
LYMPHOCYTES NFR BLD: 41.3 % (ref 22–41)
MCH RBC QN AUTO: 30.1 PG (ref 27–33)
MCHC RBC AUTO-ENTMCNC: 33.1 G/DL (ref 32.3–36.5)
MCV RBC AUTO: 90.9 FL (ref 81.4–97.8)
MONOCYTES # BLD AUTO: 0.57 K/UL (ref 0–0.85)
MONOCYTES NFR BLD AUTO: 9 % (ref 0–13.4)
NEUTROPHILS # BLD AUTO: 2.81 K/UL (ref 1.82–7.42)
NEUTROPHILS NFR BLD: 44.6 % (ref 44–72)
NRBC # BLD AUTO: 0 K/UL
NRBC BLD-RTO: 0 /100 WBC (ref 0–0.2)
PLATELET # BLD AUTO: 246 K/UL (ref 164–446)
PMV BLD AUTO: 9.9 FL (ref 9–12.9)
POTASSIUM SERPL-SCNC: 4.3 MMOL/L (ref 3.6–5.5)
PROT SERPL-MCNC: 6.9 G/DL (ref 6–8.2)
PSA SERPL-MCNC: 1.82 NG/ML (ref 0–4)
RBC # BLD AUTO: 5.15 M/UL (ref 4.7–6.1)
SODIUM SERPL-SCNC: 141 MMOL/L (ref 135–145)
T4 FREE SERPL-MCNC: 1.91 NG/DL (ref 0.93–1.7)
TRIGL SERPL-MCNC: 138 MG/DL (ref 0–149)
TSH SERPL DL<=0.005 MIU/L-ACNC: 2.23 UIU/ML (ref 0.38–5.33)
WBC # BLD AUTO: 6.3 K/UL (ref 4.8–10.8)

## 2024-03-20 PROCEDURE — 84443 ASSAY THYROID STIM HORMONE: CPT

## 2024-03-20 PROCEDURE — 84439 ASSAY OF FREE THYROXINE: CPT

## 2024-03-20 PROCEDURE — 87389 HIV-1 AG W/HIV-1&-2 AB AG IA: CPT

## 2024-03-20 PROCEDURE — 82306 VITAMIN D 25 HYDROXY: CPT

## 2024-03-20 PROCEDURE — 85025 COMPLETE CBC W/AUTO DIFF WBC: CPT

## 2024-03-20 PROCEDURE — 80053 COMPREHEN METABOLIC PANEL: CPT

## 2024-03-20 PROCEDURE — 36415 COLL VENOUS BLD VENIPUNCTURE: CPT

## 2024-03-20 PROCEDURE — 80061 LIPID PANEL: CPT

## 2024-03-20 PROCEDURE — 84153 ASSAY OF PSA TOTAL: CPT

## 2024-03-20 PROCEDURE — 86803 HEPATITIS C AB TEST: CPT

## 2024-03-21 ENCOUNTER — E-CONSULT (OUTPATIENT)
Dept: ENDOCRINOLOGY | Facility: MEDICAL CENTER | Age: 49
End: 2024-03-21
Payer: COMMERCIAL

## 2024-03-21 DIAGNOSIS — R79.89 ELEVATED SERUM FREE T4 LEVEL: ICD-10-CM

## 2024-03-21 DIAGNOSIS — Z71.9 ENCOUNTER FOR CONSULTATION: ICD-10-CM

## 2024-03-22 NOTE — PROGRESS NOTES
E-Consult Response     After careful review of the patient's information available in the medical record, the following are my findings and recommendations:    Reason for consult: Recommendations for patient with abnormal thyroid blood work with normal TSH and elevated free T4 levels    Summary of data reviewed: In summary this is a 48-year-old gentleman born on August 8, 1975 with abnormal thyroid labs since 2022    He has diagnosis of hyperlipidemia hypertension obesity erectile dysfunction sleep apnea    He is on atorvastatin and Viagra losartan Valtrex and Lunesta    His thyroid labs are as follows     Latest Reference Range & Units 06/29/22 14:32 03/09/23 07:05 03/20/24 06:24   TSH 0.380 - 5.330 uIU/mL 2.790 1.700 2.230   Free T-4 0.93 - 1.70 ng/dL 1.85 (H) 1.81 (H) 1.91 (H)   T3,Free 2.00 - 4.40 pg/mL 3.04 3.67    (H): Data is abnormally high    Recommendations: There are many differentials in this particular case including thyroid hormone binding abnormalities, thyroid hormone resistance and TSH producing pituitary adenoma    I would exclude first abnormalities of thyroid hormone binding in the serum by checking free T4 by equilibrium dialysis along with a TSH    I also recommend getting a T4 binding panel  And also getting heterophil antibodies      I also recommend measuring on alpha subunit to help rule out a TSH producing pituitary adenoma     if the patient has new onset headaches that are worsening I would recommend getting a pituitary MRI especially if the alpha subunit is elevated      For the diagnosis of thyroid Hormone resistance genetic testing is usually required for confirmation    I also recommend referral to [facility]      E-Consult Time: 31 minutes were spent with >50% of the total time spent discussing plan of care with requesting physician (Use code [id number])    Kaiser Pino M.D.

## 2024-03-29 DIAGNOSIS — R79.89 ELEVATED SERUM FREE T4 LEVEL: ICD-10-CM

## 2024-04-07 DIAGNOSIS — F51.02 ADJUSTMENT INSOMNIA: ICD-10-CM

## 2024-04-09 RX ORDER — ESZOPICLONE 3 MG/1
3 TABLET, FILM COATED ORAL NIGHTLY PRN
Qty: 30 TABLET | Refills: 0 | Status: SHIPPED | OUTPATIENT
Start: 2024-04-09 | End: 2024-05-09

## 2024-04-09 NOTE — TELEPHONE ENCOUNTER
Received request via: Pharmacy    Was the patient seen in the last year in this department? Yes    Does the patient have an active prescription (recently filled or refills available) for medication(s) requested? No    [facility]    Does the patient have residential Plus and need 100 day supply (blood pressure, diabetes and cholesterol meds only)? Patient does not have SCP

## 2024-05-08 DIAGNOSIS — F51.02 ADJUSTMENT INSOMNIA: ICD-10-CM

## 2024-05-08 NOTE — TELEPHONE ENCOUNTER
Received request via: Pharmacy    Was the patient seen in the last year in this department? Yes    Does the patient have an active prescription (recently filled or refills available) for medication(s) requested? No    Ph[facility]    Does the patient have [facility] Plus and need 100 day supply (blood pressure, diabetes and cholesterol meds only)? Patient does not have SCP

## 2024-05-09 RX ORDER — ESZOPICLONE 3 MG/1
3 TABLET, FILM COATED ORAL NIGHTLY PRN
Qty: 30 TABLET | Refills: 0 | Status: SHIPPED | OUTPATIENT
Start: 2024-05-09 | End: 2024-06-08

## 2024-05-17 ENCOUNTER — DOCUMENTATION (OUTPATIENT)
Dept: HEALTH INFORMATION MANAGEMENT | Facility: OTHER | Age: 49
End: 2024-05-17
Payer: COMMERCIAL

## 2024-05-19 DIAGNOSIS — I10 PRIMARY HYPERTENSION: ICD-10-CM

## 2024-05-19 DIAGNOSIS — E78.2 MIXED HYPERLIPIDEMIA: ICD-10-CM

## 2024-05-21 RX ORDER — LOSARTAN POTASSIUM 100 MG/1
100 TABLET ORAL DAILY
Qty: 90 TABLET | Refills: 3 | Status: SHIPPED | OUTPATIENT
Start: 2024-05-21

## 2024-05-21 RX ORDER — ATORVASTATIN CALCIUM 20 MG/1
20 TABLET, FILM COATED ORAL DAILY
Qty: 90 TABLET | Refills: 3 | Status: SHIPPED | OUTPATIENT
Start: 2024-05-21

## 2024-06-04 ENCOUNTER — TELEMEDICINE (OUTPATIENT)
Dept: MEDICAL GROUP | Facility: PHYSICIAN GROUP | Age: 49
End: 2024-06-04
Payer: COMMERCIAL

## 2024-06-04 VITALS — BODY MASS INDEX: 30.87 KG/M2 | RESPIRATION RATE: 18 BRPM | HEIGHT: 74 IN | WEIGHT: 240.5 LBS

## 2024-06-04 DIAGNOSIS — Z12.11 COLON CANCER SCREENING: ICD-10-CM

## 2024-06-04 DIAGNOSIS — F51.02 ADJUSTMENT INSOMNIA: ICD-10-CM

## 2024-06-04 PROCEDURE — 99213 OFFICE O/P EST LOW 20 MIN: CPT | Mod: 95 | Performed by: FAMILY MEDICINE

## 2024-06-04 RX ORDER — ESZOPICLONE 3 MG/1
3 TABLET, FILM COATED ORAL NIGHTLY PRN
Qty: 90 TABLET | Refills: 0 | Status: SHIPPED | OUTPATIENT
Start: 2024-06-04 | End: 2024-09-02

## 2024-06-04 ASSESSMENT — FIBROSIS 4 INDEX: FIB4 SCORE: 0.77

## 2024-06-04 NOTE — PROGRESS NOTES
"Virtual Visit: Established Patient   This visit was conducted via Zoom using secure and encrypted videoconferencing technology.   The patient was in their home in the state of [place].    The patient's identity was confirmed and verbal consent was obtained for this virtual visit.    Subjective:   CC:   Chief Complaint   Patient presents with    Requesting Labs     FIT      Requesting Cologuard for colon cancer screening    Requesting Lunesta prescription for insomnia    Current medicines (including changes today)  Current Outpatient Medications   Medication Sig Dispense Refill    losartan (COZAAR) 100 MG Tab Take 1 Tablet by mouth every day. 90 Tablet 3    atorvastatin (LIPITOR) 20 MG Tab Take 1 Tablet by mouth every day. 90 Tablet 3    Eszopiclone (LUNESTA) 3 MG Tab Take 1 Tablet by mouth at bedtime as needed (insomnia) for up to 30 days. 30 Tablet 0    sildenafil citrate (VIAGRA) 50 MG tablet Take 1 Tablet by mouth 1 time a day as needed for Erectile Dysfunction. 50 mg once daily as needed 1 hour before sexual activity 10 Tablet 3    ascorbic acid (ASCORBIC ACID) 500 MG Tab Take 1 Tablet by mouth every day. 90 Tablet 3    valACYclovir (VALTREX) 500 MG Tab Take 500 mg twice daily for 3 days during episodes of herpes labialis 30 Tablet 3    Cholecalciferol (VITAMIN D) 2000 UNIT Tab Take 1 Tablet by mouth every day. 90 Tablet 3     No current facility-administered medications for this visit.     Patient Active Problem List    Diagnosis Date Noted    Elevated serum free T4 level 03/21/2024    ED (erectile dysfunction) 03/30/2023    Epididymal cyst 03/30/2023    DAY (obstructive sleep apnea) 03/30/2023    Herpes labialis 01/19/2023    Prediabetes 10/13/2022    Vitamin D deficiency 06/28/2022    Primary hypertension 03/23/2022    Mixed hyperlipidemia 03/23/2022    Obesity (BMI 30.0-34.9) 03/23/2022    Diverticulosis of colon 03/23/2022      Objective:   Resp 18   Ht 1.88 m (6' 2\")   Wt 109 kg (240 lb 8 oz)   BMI 30.88 " kg/m²     Physical Exam:  Constitutional: Alert, no distress, well-groomed.  Psych: Alert and oriented x3, normal affect and mood.     Assessment and Plan:   1. Adjustment insomnia  Chronic, stable.  Continue Lunesta 3 mg nightly as needed for insomnia.  Follow-up every 3 months for controlled substance refills  - Eszopiclone (LUNESTA) 3 MG Tab; Take 1 Tablet by mouth at bedtime as needed (insomnia) for up to 90 days. Indications: Trouble Sleeping  Dispense: 90 Tablet; Refill: 0    2. Colon cancer screening  Last Cologuard negative 2/23/2023.  Advised that this is good for 3 years, with next routine colon cancer screening due 2/23/2026

## 2024-11-15 ENCOUNTER — TELEPHONE (OUTPATIENT)
Dept: MEDICAL GROUP | Facility: PHYSICIAN GROUP | Age: 49
End: 2024-11-15
Payer: COMMERCIAL

## 2024-11-15 NOTE — TELEPHONE ENCOUNTER
Phone Number Called: [phone] (home)       Call outcome: Left detailed message for patient. Informed to call back with any additional questions.    Message: Received a refill for Eszpiclone 3mg Tab. Pt needs to establish with pcp for refills.

## 2025-01-21 ENCOUNTER — APPOINTMENT (OUTPATIENT)
Dept: MEDICAL GROUP | Facility: MEDICAL CENTER | Age: 50
End: 2025-01-21
Payer: COMMERCIAL

## 2025-02-10 ENCOUNTER — TELEPHONE (OUTPATIENT)
Dept: SLEEP MEDICINE | Facility: MEDICAL CENTER | Age: 50
End: 2025-02-10
Payer: COMMERCIAL

## 2025-02-10 NOTE — TELEPHONE ENCOUNTER
Patient LVM saying he needs a new prescription for a new CPAP, Patient was last seen 5.9.23 per clinic policy we are unable to give patient scripts for supplies or cpap if patient hasn't been seen in a year. Sent patient Rosalbat letting him now that we are unable to give him the script as he hasn't been seen in our clinic in over a year. He can schedule an appointment in our office or he can reach out to his pcp for a script.

## 2025-02-11 ENCOUNTER — TELEMEDICINE (OUTPATIENT)
Dept: SLEEP MEDICINE | Facility: MEDICAL CENTER | Age: 50
End: 2025-02-11
Attending: NURSE PRACTITIONER
Payer: COMMERCIAL

## 2025-02-11 VITALS — BODY MASS INDEX: 31.44 KG/M2 | WEIGHT: 245 LBS | HEIGHT: 74 IN

## 2025-02-11 DIAGNOSIS — G47.33 OSA (OBSTRUCTIVE SLEEP APNEA): ICD-10-CM

## 2025-02-11 ASSESSMENT — FIBROSIS 4 INDEX: FIB4 SCORE: 0.8

## 2025-02-11 ASSESSMENT — PATIENT HEALTH QUESTIONNAIRE - PHQ9: CLINICAL INTERPRETATION OF PHQ2 SCORE: 0

## 2025-02-11 NOTE — PROGRESS NOTES
Virtual Visit: Established Patient   This visit was conducted via Teams using secure and encrypted videoconferencing technology.   The patient was in their home in the [facility].    The patient's identity was confirmed and verbal consent was obtained for this virtual visit.     Subjective:   CC:   Chief Complaint   Patient presents with    Follow-Up     SLEEP - ESTABLISHED PT CHART PREP COMPLETED ON 02/10/2025   Setup date: 06/06/2023  Last Office Visit 05/09/2023 with Dr. Sutherland    1st Compliance: No     DME: [facility]    PAP/O2/OAT:   Device  AirSense 10 AutoSet  Min Pressure (cmH2O)  5.0  Max Pressure (cmH2O)  15.0  Response  Standard  EPR  Fulltime  EPR level (cmH2O)  3     Ramp enable  Off  Ramp time (min)  5  Start pressure (cmH2O)  4.0    Wireless Data? YES [employer]        Kaiser Montalvo is a 49 y.o. male presenting for evaluation and management of:    DAY.  Patient is presenting today to request order for new CPAP device.  Previously seen 5/9/2023 with sleep apnea diagnosis and order of CPAP provided at that time. Did not follow-up since.  Previously complained of loud snoring in his sleep. In addition to the snoring he has been told by his wife that he does gasp in his sleep and have pauses in breathing. Overall he finds his sleep restorative. Denies any excessive daytime sleepiness or fatigue. Continues to exercise regularly.     Patient was send at the time of order and has been using CPAP regularly.  Currently using an over the nose nasal mask.  Denies any difficulty with mask fit or pressures.  Averages between 6 to 7-1/2 hours of sleep and denies any difficulty falling or staying asleep.  Notes improvement in sleep quality.  Notes resolution of snoring and waking up gasping or choking.  Denies any excessive daytime sleepiness, morning headaches, palpitations, concentration or memory problems.  Does request order for second CPAP to be purchased out-of-pocket to use as a travel device.    30-day  "compliance shows auto CPAP 5 to 15 cm/H2O.  100% use.  Average time of 6 hours and 22 minutes and a residual AHI of 0.7 with no evidence of mask leak.    ROS       Current medicines (including changes today)  Current Outpatient Medications   Medication Sig Dispense Refill    losartan (COZAAR) 100 MG Tab Take 1 Tablet by mouth every day. 90 Tablet 3    atorvastatin (LIPITOR) 20 MG Tab Take 1 Tablet by mouth every day. 90 Tablet 3    sildenafil citrate (VIAGRA) 50 MG tablet Take 1 Tablet by mouth 1 time a day as needed for Erectile Dysfunction. 50 mg once daily as needed 1 hour before sexual activity 10 Tablet 3    ascorbic acid (ASCORBIC ACID) 500 MG Tab Take 1 Tablet by mouth every day. 90 Tablet 3    valACYclovir (VALTREX) 500 MG Tab Take 500 mg twice daily for 3 days during episodes of herpes labialis 30 Tablet 3    Cholecalciferol (VITAMIN D) 2000 UNIT Tab Take 1 Tablet by mouth every day. 90 Tablet 3     No current facility-administered medications for this visit.       Patient Active Problem List    Diagnosis Date Noted    Elevated serum free T4 level 03/21/2024    ED (erectile dysfunction) 03/30/2023    Epididymal cyst 03/30/2023    DAY (obstructive sleep apnea) 03/30/2023    Herpes labialis 01/19/2023    Prediabetes 10/13/2022    Vitamin D deficiency 06/28/2022    Primary hypertension 03/23/2022    Mixed hyperlipidemia 03/23/2022    Obesity (BMI 30.0-34.9) 03/23/2022    Diverticulosis of colon 03/23/2022        Objective:   Ht 1.88 m (6' 2\")   Wt 111 kg (245 lb)   BMI 31.46 kg/m²     Physical Exam:  Constitutional: Alert, no distress, well-groomed.  Skin: No rashes in visible areas.  Eye: Round. Conjunctiva clear, lids normal. No icterus.   ENMT: Lips pink without lesions, good dentition, moist mucous membranes. Phonation normal.  Neck: No masses, no thyromegaly. Moves freely without pain.  Respiratory: Unlabored respiratory effort, no cough or audible wheeze  Psych: Alert and oriented x3, normal affect and " mood.     Assessment and Plan:   The following treatment plan was discussed:     1. DAY (obstructive sleep apnea)  - DME Mask and Supplies  - DME CPAP  Using and benefiting from CPAP therapy.  Compliance shows adequate use and control of DAY.  Order placed for mask and supplies  And new CPAP to be purchased out-of-pocket.  Will fax orders to patient.  Advised patient to follow-up in 1 year, but can be seen sooner if needed.    Follow-up: No follow-ups on file.